# Patient Record
Sex: MALE | Race: WHITE | NOT HISPANIC OR LATINO | URBAN - METROPOLITAN AREA
[De-identification: names, ages, dates, MRNs, and addresses within clinical notes are randomized per-mention and may not be internally consistent; named-entity substitution may affect disease eponyms.]

---

## 2020-06-09 ENCOUNTER — OFFICE VISIT (OUTPATIENT)
Dept: URBAN - METROPOLITAN AREA LAB 3 | Facility: LAB | Age: 65
End: 2020-06-09

## 2021-05-18 PROBLEM — 27719009 ACUTE GI BLEEDING: Status: ACTIVE | Noted: 2021-05-18

## 2021-06-21 ENCOUNTER — TELEPHONE ENCOUNTER (OUTPATIENT)
Dept: URBAN - METROPOLITAN AREA CLINIC 98 | Facility: CLINIC | Age: 66
End: 2021-06-21

## 2021-06-28 ENCOUNTER — OFFICE VISIT (OUTPATIENT)
Dept: URBAN - METROPOLITAN AREA LAB 3 | Facility: LAB | Age: 66
End: 2021-06-28

## 2021-07-01 ENCOUNTER — TELEPHONE ENCOUNTER (OUTPATIENT)
Dept: URBAN - METROPOLITAN AREA CLINIC 19 | Facility: CLINIC | Age: 66
End: 2021-07-01

## 2021-07-07 PROBLEM — 429699009: Status: ACTIVE | Noted: 2021-06-23

## 2021-07-22 ENCOUNTER — OFFICE VISIT (OUTPATIENT)
Dept: URBAN - METROPOLITAN AREA MEDICAL CENTER 25 | Facility: MEDICAL CENTER | Age: 66
End: 2021-07-22
Payer: MEDICARE

## 2021-07-22 ENCOUNTER — DASHBOARD ENCOUNTERS (OUTPATIENT)
Age: 66
End: 2021-07-22

## 2021-07-22 DIAGNOSIS — D12.5 ADENOMA OF SIGMOID COLON: ICD-10-CM

## 2021-07-22 DIAGNOSIS — Z85.038 H/O COLON CANCER, STAGE I: ICD-10-CM

## 2021-07-22 DIAGNOSIS — D12.8 ADENOMATOUS POLYP OF RECTUM: ICD-10-CM

## 2021-07-22 DIAGNOSIS — D12.3 ADENOMA OF TRANSVERSE COLON: ICD-10-CM

## 2021-07-22 DIAGNOSIS — D12.4 ADENOMA OF DESCENDING COLON: ICD-10-CM

## 2021-07-22 PROCEDURE — 45380 COLONOSCOPY AND BIOPSY: CPT | Performed by: INTERNAL MEDICINE

## 2021-07-22 PROCEDURE — 45385 COLONOSCOPY W/LESION REMOVAL: CPT | Performed by: INTERNAL MEDICINE

## 2021-07-22 RX ORDER — SOY PROTEIN
POWDER (GRAM) ORAL
Qty: 0 | Refills: 0 | Status: ACTIVE | COMMUNITY
Start: 1900-01-01 | End: 1900-01-01

## 2021-07-28 ENCOUNTER — TELEPHONE ENCOUNTER (OUTPATIENT)
Dept: URBAN - METROPOLITAN AREA CLINIC 92 | Facility: CLINIC | Age: 66
End: 2021-07-28

## 2022-02-15 PROBLEM — I25.10 NONOCCLUSIVE CORONARY ATHEROSCLEROSIS OF NATIVE CORONARY ARTERY: Status: ACTIVE | Noted: 2019-12-09

## 2022-02-15 PROBLEM — T85.611A PD CATHETER DYSFUNCTION (HCC): Status: ACTIVE | Noted: 2022-02-02

## 2022-02-15 PROBLEM — I10 ESSENTIAL HYPERTENSION: Status: ACTIVE | Noted: 2019-06-12

## 2022-02-15 PROBLEM — D69.6 THROMBOCYTOPENIA (HCC): Status: ACTIVE | Noted: 2022-02-15

## 2022-02-15 PROBLEM — M25.561 ARTHRALGIA OF BOTH KNEES: Status: ACTIVE | Noted: 2019-05-13

## 2022-02-15 PROBLEM — Z95.0 PRESENCE OF CARDIAC PACEMAKER: Status: ACTIVE | Noted: 2019-12-09

## 2022-02-15 PROBLEM — I44.2 CHB (COMPLETE HEART BLOCK) (HCC): Status: ACTIVE | Noted: 2019-10-30

## 2022-02-15 PROBLEM — N40.0 BENIGN PROSTATIC HYPERPLASIA: Status: ACTIVE | Noted: 2019-08-15

## 2022-02-15 PROBLEM — M25.562 ARTHRALGIA OF BOTH KNEES: Status: ACTIVE | Noted: 2019-05-13

## 2022-02-15 PROBLEM — N18.5 CHRONIC KIDNEY DISEASE (CKD), STAGE V (HCC): Status: ACTIVE | Noted: 2022-02-02

## 2022-02-15 PROBLEM — I48.19 PERSISTENT ATRIAL FIBRILLATION (HCC): Status: ACTIVE | Noted: 2019-06-12

## 2022-02-16 PROBLEM — D69.6 THROMBOCYTOPENIA (HCC): Status: RESOLVED | Noted: 2022-02-15 | Resolved: 2022-02-16

## 2022-03-04 PROBLEM — Z79.01 CHRONIC ANTICOAGULATION: Status: ACTIVE | Noted: 2022-03-04

## 2022-03-04 PROBLEM — I48.19 PERSISTENT ATRIAL FIBRILLATION (HCC): Status: RESOLVED | Noted: 2019-06-12 | Resolved: 2022-03-04

## 2022-03-04 PROBLEM — Z98.890 S/P MITRAL VALVE REPAIR: Status: ACTIVE | Noted: 2022-03-04

## 2022-03-04 PROBLEM — I48.21 PERMANENT ATRIAL FIBRILLATION (HCC): Status: ACTIVE | Noted: 2022-03-04

## 2022-03-14 ENCOUNTER — HOSPITAL ENCOUNTER (OUTPATIENT)
Dept: LAB | Age: 67
Discharge: HOME | End: 2022-03-14
Payer: MEDICARE

## 2022-03-14 DIAGNOSIS — Z85.038 HISTORY OF COLON CANCER: ICD-10-CM

## 2022-03-14 DIAGNOSIS — R68.89 OTHER GENERAL SYMPTOMS AND SIGNS: ICD-10-CM

## 2022-03-14 DIAGNOSIS — D64.9 ANEMIA, UNSPECIFIED TYPE: ICD-10-CM

## 2022-03-14 LAB
ALBUMIN SERPL-MCNC: 3 G/DL (ref 3.2–4.6)
ALBUMIN/GLOB SERPL: 0.7 {RATIO} (ref 1.2–3.5)
ALP SERPL-CCNC: 82 U/L (ref 50–136)
ALT SERPL-CCNC: 10 U/L (ref 12–65)
ANION GAP SERPL CALC-SCNC: 9 MMOL/L (ref 7–16)
AST SERPL-CCNC: 18 U/L (ref 15–37)
BASOPHILS # BLD: 0.1 K/UL (ref 0–0.2)
BASOPHILS NFR BLD: 1 % (ref 0–2)
BILIRUB SERPL-MCNC: 0.4 MG/DL (ref 0.2–1.1)
BUN SERPL-MCNC: 69 MG/DL (ref 8–23)
CALCIUM SERPL-MCNC: 8.5 MG/DL (ref 8.3–10.4)
CEA SERPL-MCNC: 5.7 NG/ML (ref 0–3)
CHLORIDE SERPL-SCNC: 100 MMOL/L (ref 98–107)
CO2 SERPL-SCNC: 26 MMOL/L (ref 21–32)
CREAT SERPL-MCNC: 6 MG/DL (ref 0.8–1.5)
DIFFERENTIAL METHOD BLD: ABNORMAL
EOSINOPHIL # BLD: 0.3 K/UL (ref 0–0.8)
EOSINOPHIL NFR BLD: 3 % (ref 0.5–7.8)
ERYTHROCYTE [DISTWIDTH] IN BLOOD BY AUTOMATED COUNT: 14.2 % (ref 11.9–14.6)
FERRITIN SERPL-MCNC: 496 NG/ML (ref 8–388)
FOLATE SERPL-MCNC: 8.7 NG/ML (ref 3.1–17.5)
GLOBULIN SER CALC-MCNC: 4.2 G/DL (ref 2.3–3.5)
GLUCOSE SERPL-MCNC: 142 MG/DL (ref 65–100)
HCT VFR BLD AUTO: 36.7 %
HGB BLD-MCNC: 11.2 G/DL (ref 13.6–17.2)
HGB RETIC QN AUTO: 36 PG (ref 29–35)
IMM GRANULOCYTES # BLD AUTO: 0.1 K/UL (ref 0–0.5)
IMM GRANULOCYTES NFR BLD AUTO: 1 % (ref 0–5)
IMM RETICS NFR: 21.9 % (ref 2.3–13.4)
IRON SATN MFR SERPL: 21 %
IRON SERPL-MCNC: 57 UG/DL (ref 35–150)
LYMPHOCYTES # BLD: 0.9 K/UL (ref 0.5–4.6)
LYMPHOCYTES NFR BLD: 11 % (ref 13–44)
MCH RBC QN AUTO: 30.6 PG (ref 26.1–32.9)
MCHC RBC AUTO-ENTMCNC: 30.5 G/DL (ref 31.4–35)
MCV RBC AUTO: 100.3 FL (ref 79.6–97.8)
MONOCYTES # BLD: 0.5 K/UL (ref 0.1–1.3)
MONOCYTES NFR BLD: 6 % (ref 4–12)
NEUTS SEG # BLD: 6.2 K/UL (ref 1.7–8.2)
NEUTS SEG NFR BLD: 78 % (ref 43–78)
NRBC # BLD: 0 K/UL (ref 0–0.2)
PLATELET # BLD AUTO: 200 K/UL (ref 150–450)
PMV BLD AUTO: 12.5 FL (ref 9.4–12.3)
POTASSIUM SERPL-SCNC: 3.5 MMOL/L (ref 3.5–5.1)
PROT SERPL-MCNC: 7.2 G/DL (ref 6.3–8.2)
RBC # BLD AUTO: 3.66 M/UL (ref 4.23–5.6)
RETICS # AUTO: 0.09 M/UL (ref 0.03–0.1)
RETICS/RBC NFR AUTO: 2.5 % (ref 0.3–2)
SODIUM SERPL-SCNC: 135 MMOL/L (ref 136–145)
TIBC SERPL-MCNC: 274 UG/DL (ref 250–450)
VIT B12 SERPL-MCNC: 433 PG/ML (ref 193–986)
WBC # BLD AUTO: 8 K/UL (ref 4.3–11.1)

## 2022-03-14 PROCEDURE — 82746 ASSAY OF FOLIC ACID SERUM: CPT

## 2022-03-14 PROCEDURE — 83540 ASSAY OF IRON: CPT

## 2022-03-14 PROCEDURE — 36415 COLL VENOUS BLD VENIPUNCTURE: CPT

## 2022-03-14 PROCEDURE — 80053 COMPREHEN METABOLIC PANEL: CPT

## 2022-03-14 PROCEDURE — 82378 CARCINOEMBRYONIC ANTIGEN: CPT

## 2022-03-14 PROCEDURE — 82728 ASSAY OF FERRITIN: CPT

## 2022-03-14 PROCEDURE — 85046 RETICYTE/HGB CONCENTRATE: CPT

## 2022-03-14 PROCEDURE — 85025 COMPLETE CBC W/AUTO DIFF WBC: CPT

## 2022-03-14 PROCEDURE — 82607 VITAMIN B-12: CPT

## 2022-03-18 PROBLEM — N18.5 CHRONIC KIDNEY DISEASE (CKD), STAGE V (HCC): Status: ACTIVE | Noted: 2022-02-02

## 2022-03-19 PROBLEM — M25.562 ARTHRALGIA OF BOTH KNEES: Status: ACTIVE | Noted: 2019-05-13

## 2022-03-19 PROBLEM — I10 ESSENTIAL HYPERTENSION: Status: ACTIVE | Noted: 2019-06-12

## 2022-03-19 PROBLEM — I44.2 CHB (COMPLETE HEART BLOCK) (HCC): Status: ACTIVE | Noted: 2019-10-30

## 2022-03-19 PROBLEM — N40.0 BENIGN PROSTATIC HYPERPLASIA: Status: ACTIVE | Noted: 2019-08-15

## 2022-03-19 PROBLEM — I25.10 NONOCCLUSIVE CORONARY ATHEROSCLEROSIS OF NATIVE CORONARY ARTERY: Status: ACTIVE | Noted: 2019-12-09

## 2022-03-19 PROBLEM — Z98.890 S/P MITRAL VALVE REPAIR: Status: ACTIVE | Noted: 2022-03-04

## 2022-03-19 PROBLEM — Z95.0 S/P PLACEMENT OF CARDIAC PACEMAKER: Status: ACTIVE | Noted: 2019-12-09

## 2022-03-19 PROBLEM — M25.561 ARTHRALGIA OF BOTH KNEES: Status: ACTIVE | Noted: 2019-05-13

## 2022-03-19 PROBLEM — T85.611A PD CATHETER DYSFUNCTION (HCC): Status: ACTIVE | Noted: 2022-02-02

## 2022-03-19 PROBLEM — Z79.01 CHRONIC ANTICOAGULATION: Status: ACTIVE | Noted: 2022-03-04

## 2022-03-19 PROBLEM — I48.21 PERMANENT ATRIAL FIBRILLATION (HCC): Status: ACTIVE | Noted: 2022-03-04

## 2022-05-23 DIAGNOSIS — Z85.038 HISTORY OF COLON CANCER: Primary | ICD-10-CM

## 2022-06-01 ENCOUNTER — OFFICE VISIT (OUTPATIENT)
Dept: CARDIOLOGY CLINIC | Age: 67
End: 2022-06-01
Payer: MEDICARE

## 2022-06-01 VITALS
WEIGHT: 266 LBS | BODY MASS INDEX: 39.4 KG/M2 | SYSTOLIC BLOOD PRESSURE: 138 MMHG | HEIGHT: 69 IN | DIASTOLIC BLOOD PRESSURE: 70 MMHG | HEART RATE: 90 BPM

## 2022-06-01 DIAGNOSIS — I48.21 PERMANENT ATRIAL FIBRILLATION (HCC): ICD-10-CM

## 2022-06-01 DIAGNOSIS — I25.10 NONOCCLUSIVE CORONARY ATHEROSCLEROSIS OF NATIVE CORONARY ARTERY: Primary | ICD-10-CM

## 2022-06-01 DIAGNOSIS — I10 ESSENTIAL HYPERTENSION: ICD-10-CM

## 2022-06-01 DIAGNOSIS — Z79.01 CHRONIC ANTICOAGULATION: ICD-10-CM

## 2022-06-01 DIAGNOSIS — Z95.0 S/P PLACEMENT OF CARDIAC PACEMAKER: ICD-10-CM

## 2022-06-01 DIAGNOSIS — Z98.890 S/P MITRAL VALVE REPAIR: ICD-10-CM

## 2022-06-01 DIAGNOSIS — N18.6 ESRD (END STAGE RENAL DISEASE) ON DIALYSIS (HCC): ICD-10-CM

## 2022-06-01 DIAGNOSIS — Z99.2 ESRD (END STAGE RENAL DISEASE) ON DIALYSIS (HCC): ICD-10-CM

## 2022-06-01 PROCEDURE — G8427 DOCREV CUR MEDS BY ELIG CLIN: HCPCS | Performed by: INTERNAL MEDICINE

## 2022-06-01 PROCEDURE — G8417 CALC BMI ABV UP PARAM F/U: HCPCS | Performed by: INTERNAL MEDICINE

## 2022-06-01 PROCEDURE — 3017F COLORECTAL CA SCREEN DOC REV: CPT | Performed by: INTERNAL MEDICINE

## 2022-06-01 PROCEDURE — 1123F ACP DISCUSS/DSCN MKR DOCD: CPT | Performed by: INTERNAL MEDICINE

## 2022-06-01 PROCEDURE — 99214 OFFICE O/P EST MOD 30 MIN: CPT | Performed by: INTERNAL MEDICINE

## 2022-06-01 PROCEDURE — 1036F TOBACCO NON-USER: CPT | Performed by: INTERNAL MEDICINE

## 2022-06-01 RX ORDER — CIPROFLOXACIN 250 MG/1
250 TABLET, FILM COATED ORAL 2 TIMES DAILY
COMMUNITY
End: 2022-10-12

## 2022-06-01 RX ORDER — METHYLPREDNISOLONE 4 MG/1
2 TABLET ORAL DAILY
COMMUNITY
End: 2022-08-18

## 2022-06-01 RX ORDER — CALCITRIOL 0.5 UG/1
0.5 CAPSULE, LIQUID FILLED ORAL DAILY
COMMUNITY

## 2022-06-01 RX ORDER — NALOXONE HYDROCHLORIDE 4 MG/.1ML
1 SPRAY NASAL PRN
COMMUNITY

## 2022-06-01 RX ORDER — CARVEDILOL 12.5 MG/1
TABLET ORAL 2 TIMES DAILY
COMMUNITY
Start: 2022-02-05

## 2022-06-01 RX ORDER — FEBUXOSTAT 40 MG/1
40 TABLET, FILM COATED ORAL DAILY
COMMUNITY
End: 2022-10-12

## 2022-06-01 RX ORDER — NIFEDIPINE 60 MG/1
60 TABLET, FILM COATED, EXTENDED RELEASE ORAL DAILY
COMMUNITY
End: 2022-08-18

## 2022-06-01 ASSESSMENT — ENCOUNTER SYMPTOMS
ABDOMINAL PAIN: 0
DOUBLE VISION: 0
HEMOPTYSIS: 0
WHEEZING: 0
HOARSE VOICE: 0
HEMATEMESIS: 0
STRIDOR: 0
EYE REDNESS: 0
HEMATOCHEZIA: 0

## 2022-06-01 NOTE — PROGRESS NOTES
[facility]  [address] [facility], [address]  PHONE: [phone]          22    NAME:  Karen Mckoy  : 1955  MRN: [id number]         SUBJECTIVE:   Karen Mckoy is a 69 y.o. male seen for a visit regarding the following:     Chief Complaint   Patient presents with    Atrial Fibrillation    Results     echo           HPI:      1. Permanent Afib- started in - had stroke- Chads score= 6- on Eliquis now, was on Xarelto  -Echo from 3/18/2022 shows an EF at 60 to 65% with no regional wall motion abnormalities, mild concentric LVH, severely dilated left atrium, s/p mitral valve repair. 2.  2019- Severe Mitral regurgitation s/p mitral valve repair with 33mm annuloplasty ring- [facility] in [place]    3. Complete heart block s/p permanent pacemaker placement - Medtronic device-placed following valve repair surgery   4. Coronary artery disease from heart cath in  but nonobstructive- Catheterization from 2019-minimal luminal irregularities in the left main, LAD, circumflex and right coronary arteries. 5. ESRD on HD   6. HTN   7. HLD   8. Chronic anticoagulation-currently on Eliquis 5 mg twice daily-had GI bleed on Xarelto previously      Previous cardiologist- Dr. Renato Bills     Dear Dr Branden Nava,   I saw Mr Darcy Romero who is a pleasant 70-year-old gentleman in cardiovascular follow-up on 2022. We last saw him in consultation on 3/4/2022 for atrial fibrillation-permanent s/p permanent pacemaker placement for complete heart block-Medtronic device,   Nonobstructive coronary artery disease, hypertension, hyperlipidemia, s/p mitral valve repair for severe mitral regurgitation.  -At that time we ordered an echocardiogram to review LV function and we try to track down records from [place]. He is to be seen by Dr. Villa Thompson in [place] and has moved here to live here.     He has end-stage renal disease and has already established with a nephrologist and dialysis center.      -He had issues with peritoneal dialysis so he is now sticking with hemodialysis. Has a fistula placed in the left arm about 6 weeks ago. He has known hypertension hyperlipidemia with minor nonobstructive coronary artery disease and denies headaches or blurry vision. Feels that his blood pressures are usually fairly well controlled. Initial blood pressure readings here were high but a  recheck was better. He is on a combination of hydralazine, carvedilol and Hytrin.     -Denies significant lower extremity edema orthopnea PND-s/p mitral valve repair surgery for severe mitral regurgitation in the past-2019 with additional 33 mm annuloplasty ring.     -He has a Medtronic device for permanent pacemaker that was placed after his mitral valve surgery as he developed complete heart block. He has underlying atrial fibrillation      Denies any anginal chest discomfort in any way. Has some chronic dyspnea on exertion-NYHA class II but nothing excessive. No recent echocardiogram available to review. Denies any bleeding issues currently on Eliquis 5 mg twice daily. He was on Xarelto in the past and had a GI bleed.   -With a GFR of less than 15, and previous GI bleeding,  I want to avoid Xarelto       Past Medical History, Past Surgical History, Family history, Social History, and Medications were all reviewed with the patient today and updated as necessary.      No Known Allergies  Patient Active Problem List   Diagnosis    Chronic kidney disease (CKD), stage V ([address]    S/P mitral valve repair    Essential hypertension    Benign prostatic hyperplasia    PD catheter dysfunction ([address]    CHB (complete heart block) (HCC)    Arthralgia of both knees    S/P placement of cardiac pacemaker    Chronic anticoagulation    Nonocclusive coronary atherosclerosis of native coronary artery    Permanent atrial fibrillation ([address]    ESRD (end stage renal disease) on dialysis [facility])     Past Medical History:   Diagnosis Date    [facility]     Cancer [facility]     Chronic kidney disease     [facility]         Depression     Hypercholesterolemia     Hypertension      Past Surgical History:   Procedure Laterality Date    GASTRIC BYPASS SURGERY      HX PARTIAL COLECTOMY          OTHER SURGICAL HISTORY      mitral valve repair     Family History   Problem Relation Age of Onset    Alzheimer's Disease Sister     Colon Cancer Mother     Heart Disease Father     Kidney Disease Sister      Social History     Tobacco Use    Smoking status: Former Smoker     Quit date: 1984     Years since quittin.4    Smokeless tobacco: Never Used   Substance Use Topics    Alcohol use: Yes     Current Outpatient Medications   Medication Sig Dispense Refill    carvedilol (COREG) 12.5 MG tablet       naloxone 4 MG/0.1ML LIQD nasal spray 1 spray by Nasal route as needed for Opioid Reversal      ciprofloxacin (CIPRO) 250 mg tablet Take 250 mg by mouth 2 times daily      calcitRIOL (ROCALTROL) 0.5 MCG capsule Take 0.5 mcg by mouth daily      NIFEdipine (ADALAT CC) 60 MG extended release tablet Take 60 mg by mouth daily      febuxostat (ULORIC) 40 MG TABS tablet Take 40 mg by mouth daily      methylPREDNISolone (MEDROL) 4 MG tablet Take 2 mg by mouth daily      apixaban (ELIQUIS) 5 MG TABS tablet Take 5 mg by mouth 2 times daily      Cyanocobalamin 1000 MCG CAPS Cyanocobalamin (vitamin B-12)      escitalopram (LEXAPRO) 10 MG tablet Take 10 mg by mouth daily      hydrALAZINE (APRESOLINE) 50 MG tablet Take 50 mg by mouth 3 times daily      simvastatin (ZOCOR) 20 MG tablet Take by mouth      terazosin (HYTRIN) 5 MG capsule Take 5 mg by mouth      torsemide (DEMADEX) 20 MG tablet Take 40 mg by mouth 2 times daily       No current facility-administered medications for this visit.        Review of Systems   Constitutional: Negative for chills and fever. HENT: Negative for ear discharge, hoarse voice and stridor. Eyes: Negative for double vision and redness. Cardiovascular: Negative for cyanosis and syncope. Respiratory: Negative for hemoptysis and wheezing. Endocrine: Negative for polydipsia and polyphagia. Hematologic/Lymphatic: Negative for adenopathy. Skin: Negative for itching and rash. Musculoskeletal: Negative for joint swelling and muscle weakness. Gastrointestinal: Negative for abdominal pain, hematemesis and hematochezia. Genitourinary: Negative for flank pain and nocturia. Neurological: Negative for focal weakness and seizures. Psychiatric/Behavioral: Negative for altered mental status and suicidal ideas. Allergic/Immunologic: Negative for hives. PHYSICAL EXAM:    /70   Pulse 90   Ht 5' 9\" (1.753 m)   Wt 266 lb (120.7 kg)   BMI 39.28 kg/m²      Physical Exam    General: Alert and oriented in no acute distress  HEENT: Head is normocephalic, atraumatic, pupils are equal bilaterally, throat appears to be clear  Neck: No significant jugular venous distention no cervical bruits  Cardiovascular: S1 and S2 heard, regular  Rhythm-likely paced, rate controlled no significant murmurs rubs or gallops. Respiratory: Clear to auscultation bilaterally with no adventitious sounds, respirations are normal  Abdomen: Soft, nontender, nondistended, bowel sounds present. Extremities: No cyanosis clubbing or edema, fistula noted in the left forearm. Peripheral pulses: Bilateral radial artery pulses are palpated. Bilateral pedal pulses are diminished  Neuro: No facial droop and no gross focal motor deficits  Lymphatic: No significant cervical lymphadenopathy noted. Musculoskeletal: No significant redness or swelling noted in all exposed joints. Skin: No significant rashes noted the of the exposed regions. Medical problems and test results were reviewed with the patient today.      Recent Results (from the past 672 hour(s))   Transthoracic echocardiogram (TTE) complete with contrast, bubble, strain, and 3D PRN    Collection Time: 05/05/22  4:10 PM   Result Value Ref Range    LV ESV A4C 47 mL    LV EDV A4C 121 mL    LV ESV A2C 36 mL    LV EDV A2C 86 mL    LVOT VTI 20.4 cm    LVOT Diameter 2.2 cm    LVOT Mean Gradient 3 mmHg    LVOT Peak Velocity 1.1 m/s    LVPWd 1.2 (A) 0.6 - 1 cm    LVOT Peak Gradient 5 mmHg    IVSd 1.2 (A) 0.6 - 1 cm    LVIDs 2.7 cm    LVIDd 4.7 4.2 - 5.9 cm    EF BP 61 55 - 100 %    LVOT SV 77.5 ml    LV Ejection Fraction A2C 58 %    LV Ejection Fraction A4C 62 %    LA Diameter 5.8 cm    AV Peak Gradient 8 mmHg    AV Peak Velocity 1.4 m/s    AV Area by Peak Velocity 3.0 Square Centimeter    MV E Velocity 1.32 m/s    MV Mean Gradient 2 mmHg    MV Peak Gradient 7 mmHg    MV .0 ms    MV VTI 38.1 cm    MV Mean Velocity 0.7 m/s    MV Area by PHT 1.7 Square Centimeter    MV Area by VTI 2.0 Square Centimeter    TR Max Velocity 2.34 m/s    TR Peak Gradient 22 mmHg    Aortic Root 3.7 cm    Fractional Shortening 2D 43 28 - 44 %    LV ESV Index A4C 21 mL/m2    LV EDV Index A4C 53 mL/m2    LV ESV Index A2C 16 mL/m2    LV EDV Index A2C 38 mL/m2    LVIDd Index 2.05 cm/m2    LVIDs Index 1.18 cm/m2    LV RWT Ratio 0.51     LV Mass 2D 212.0 88 - 224 g    LV Mass 2D Index 92.6 49 - 115 g/m2    LVOT Stroke Volume Index 33.8 mL/m2    LVOT Area 3.8 cm2    LA Size Index 2.53 cm/m2    LA/AO Root Ratio 1.57     Ao Root Index 1.62 cm/m2    AV Velocity Ratio 0.79     MV:LVOT VTI Index 1.87     RV Basal Dimension 3.7 cm    RV Mid Dimension 2.1 cm    TAPSE 2.1 1.7 cm    LA Volume  (A) 18 - 58 mL    LA Volume Index BP 47 (A) 16 - 34 ml/m2    Est. RA Pressure 5 mmHg    RVSP 27 mmHg     No results found for: CHOL, CHOLPOCT, CHOLX, CHLST, CHOLV, HDL, HDLPOC, HDLC, LDL, LDLC, VLDLC, VLDL, TGLX, TRIGL,hemoglobin, basic metabolic panel, No results found for: TSH, TSH2, TSH3 ,  Lab Results   Component Value Date     03/14/2022    K 3.5 03/14/2022     03/14/2022    CO2 26 03/14/2022    BUN 69 03/14/2022    GFRAA 12 03/14/2022    GLOB 4.2 03/14/2022    ALT 10 03/14/2022    AST 18 03/14/2022      No results found for: LDLCALC, LDLCHOLESTEROL, LDLDIRECT     03/18/22    TRANSTHORACIC ECHOCARDIOGRAM (TTE) COMPLETE (CONTRAST/BUBBLE/3D PRN) 05/06/2022  7:23 AM (Final)        Left Ventricle: Normal left ventricular systolic function with a visually estimated EF of 60 - 65%. Left ventricle size is normal. Mildly increased wall thickness. Findings consistent with mild concentric hypertrophy. Normal wall motion.   Mitral Valve: Valve repaired. Mild annular calcification of the mitral valve.   Left Atrium: Left atrium is severely dilated. LA Vol Index is  47 ml/m2.   Aorta: Mildly dilated aortic root. Signed by: Lv Logan MD on 5/6/2022  7:23 AM       ASSESSMENT and PLAN    Nonocclusive coronary atherosclerosis of native coronary artery  -Nonobstructive CAD-managed medically-on beta-blocker therapy with calcium channel blocker therapy-dual antianginal therapy with no complaints of any angina. Also remains on appropriate statin therapy. Not on aspirin as he is on Eliquis. Permanent atrial fibrillation (HCC)  -S/p permanent pacemaker placement, on carvedilol 12.5 mg twice daily and anticoagulated with Eliquis for thromboembolic prophylaxis-5 mg twice daily as he is on hemodialysis. Has underlying complete heart block which is why his rhythm sounds regular. S/P placement of cardiac pacemaker  -Now s/p Medtronic permanent pacemaker placement-last check shows adequate battery life, short runs of nonsustained VT but preserved LV function and no concerning findings. Essential hypertension  -Well-controlled on current therapy. S/P mitral valve repair  -S/p mitral valve repair with plication of the posterior leaflet and has a #33 annuloplasty ring.   No mitral regurgitation noted on most recent echo from 2022    Chronic anticoagulation  -Remains on Eliquis for thromboembolic prophylaxis  ESRD (end stage renal disease) on dialysis [facility])  -On hemodialysis on Tuesdays Thursdays and Saturdays       Overall Impression  -Stable from a cardiac perspective overall. Multiple issues as mentioned above. Continues to have device checks remotely every 3 months and in person every 6 months. I will see him in follow-up in about 6 months time but of course we will see him sooner if needed. Return in about 6 months (around 12/1/2022) for htn, mitral regurg s/p MVR, afib. Thank you for allowing us to participate in the care of your patient. If you have any further questions, please do not hesitate to contact us.   Sincerely,        Joyce Holland MD   6/1/2022

## 2022-06-09 ENCOUNTER — TELEPHONE (OUTPATIENT)
Dept: CARDIOLOGY CLINIC | Age: 67
End: 2022-06-09

## 2022-06-09 ENCOUNTER — PREP FOR PROCEDURE (OUTPATIENT)
Dept: ADMINISTRATIVE | Age: 67
End: 2022-06-09

## 2022-06-10 RX ORDER — SODIUM CHLORIDE 0.9 % (FLUSH) 0.9 %
5-40 SYRINGE (ML) INJECTION EVERY 12 HOURS SCHEDULED
Status: CANCELLED | OUTPATIENT
Start: 2022-06-10

## 2022-06-10 RX ORDER — SODIUM CHLORIDE 0.9 % (FLUSH) 0.9 %
5-40 SYRINGE (ML) INJECTION PRN
Status: CANCELLED | OUTPATIENT
Start: 2022-06-10

## 2022-06-10 RX ORDER — SODIUM CHLORIDE 9 MG/ML
INJECTION, SOLUTION INTRAVENOUS PRN
Status: CANCELLED | OUTPATIENT
Start: 2022-06-10

## 2022-06-23 ENCOUNTER — TELEPHONE (OUTPATIENT)
Dept: CARDIOLOGY CLINIC | Age: 67
End: 2022-06-23

## 2022-06-23 NOTE — TELEPHONE ENCOUNTER
Spoke with Krystal Yi from [address] [place] [address] at [phone]. Verified what information needed to be on pts application and resubmitted. Faxed and put to have scanned into pts chart.

## 2022-06-24 ENCOUNTER — TELEPHONE (OUTPATIENT)
Dept: CARDIOLOGY CLINIC | Age: 67
End: 2022-06-24

## 2022-06-24 NOTE — TELEPHONE ENCOUNTER
Spoke to a rep from [employer] at [phone] and she stated that unfortunately this pt does not qualify for pt assistance due to a over income limit for household size. She stated pt could submit a hardship letter and state the importance of the medication. Spoke to pt and made him aware of above response. He verbalized understanding. I made him aware if he wanted to appeal and wrote the hardship letter we could provide him with a office note if needed. Also provided pt with number for [facility]. Pt states he will stay with Eliquis as he is not going to switch to Warfarin. He requested we send him in a prescription and he will pay accordingly.

## 2022-08-16 ENCOUNTER — PREP FOR PROCEDURE (OUTPATIENT)
Dept: ADMINISTRATIVE | Age: 67
End: 2022-08-16

## 2022-08-17 RX ORDER — SODIUM CHLORIDE 0.9 % (FLUSH) 0.9 %
5-40 SYRINGE (ML) INJECTION PRN
Status: CANCELLED | OUTPATIENT
Start: 2022-08-17

## 2022-08-17 RX ORDER — SODIUM CHLORIDE 0.9 % (FLUSH) 0.9 %
5-40 SYRINGE (ML) INJECTION EVERY 12 HOURS SCHEDULED
Status: CANCELLED | OUTPATIENT
Start: 2022-08-17

## 2022-08-17 RX ORDER — SODIUM CHLORIDE 9 MG/ML
INJECTION, SOLUTION INTRAVENOUS PRN
Status: CANCELLED | OUTPATIENT
Start: 2022-08-17

## 2022-08-22 ENCOUNTER — ANESTHESIA EVENT (OUTPATIENT)
Dept: ENDOSCOPY | Age: 67
End: 2022-08-22
Payer: MEDICARE

## 2022-08-22 RX ORDER — SODIUM CHLORIDE 9 MG/ML
INJECTION, SOLUTION INTRAVENOUS PRN
Status: CANCELLED | OUTPATIENT
Start: 2022-08-22

## 2022-08-22 RX ORDER — SODIUM CHLORIDE 0.9 % (FLUSH) 0.9 %
5-40 SYRINGE (ML) INJECTION PRN
Status: CANCELLED | OUTPATIENT
Start: 2022-08-22

## 2022-08-22 RX ORDER — SODIUM CHLORIDE 0.9 % (FLUSH) 0.9 %
5-40 SYRINGE (ML) INJECTION EVERY 12 HOURS SCHEDULED
Status: CANCELLED | OUTPATIENT
Start: 2022-08-22

## 2022-08-22 RX ORDER — ONDANSETRON 2 MG/ML
4 INJECTION INTRAMUSCULAR; INTRAVENOUS
Status: CANCELLED | OUTPATIENT
Start: 2022-08-22 | End: 2022-08-22

## 2022-08-23 ENCOUNTER — ANESTHESIA (OUTPATIENT)
Dept: ENDOSCOPY | Age: 67
End: 2022-08-23
Payer: MEDICARE

## 2022-08-23 ENCOUNTER — HOSPITAL ENCOUNTER (OUTPATIENT)
Age: 67
Setting detail: OUTPATIENT SURGERY
Discharge: HOME | End: 2022-08-23
Attending: INTERNAL MEDICINE | Admitting: INTERNAL MEDICINE
Payer: MEDICARE

## 2022-08-23 VITALS
HEIGHT: 70 IN | TEMPERATURE: 97 F | HEART RATE: 59 BPM | WEIGHT: 270 LBS | BODY MASS INDEX: 38.65 KG/M2 | DIASTOLIC BLOOD PRESSURE: 69 MMHG | OXYGEN SATURATION: 97 % | RESPIRATION RATE: 14 BRPM | SYSTOLIC BLOOD PRESSURE: 117 MMHG

## 2022-08-23 LAB — POTASSIUM BLD-SCNC: 3.2 MMOL/L (ref 3.5–5.1)

## 2022-08-23 PROCEDURE — 2580000003 HC RX 258: Performed by: INTERNAL MEDICINE

## 2022-08-23 PROCEDURE — 84132 ASSAY OF SERUM POTASSIUM: CPT

## 2022-08-23 PROCEDURE — 88305 TISSUE EXAM BY PATHOLOGIST: CPT

## 2022-08-23 PROCEDURE — 2580000003 HC RX 258: Performed by: ANESTHESIOLOGY

## 2022-08-23 PROCEDURE — A4216 STERILE WATER/SALINE, 10 ML: HCPCS | Performed by: ANESTHESIOLOGY

## 2022-08-23 PROCEDURE — 3700000000 HC ANESTHESIA ATTENDED CARE: Performed by: INTERNAL MEDICINE

## 2022-08-23 PROCEDURE — 2709999900 HC NON-CHARGEABLE SUPPLY: Performed by: INTERNAL MEDICINE

## 2022-08-23 PROCEDURE — 6360000002 HC RX W HCPCS: Performed by: NURSE ANESTHETIST, CERTIFIED REGISTERED

## 2022-08-23 PROCEDURE — 3609012400 HC EGD TRANSORAL BIOPSY SINGLE/MULTIPLE: Performed by: INTERNAL MEDICINE

## 2022-08-23 PROCEDURE — 3700000001 HC ADD 15 MINUTES (ANESTHESIA): Performed by: INTERNAL MEDICINE

## 2022-08-23 PROCEDURE — 2500000003 HC RX 250 WO HCPCS: Performed by: ANESTHESIOLOGY

## 2022-08-23 PROCEDURE — 3609010600 HC COLONOSCOPY POLYPECTOMY SNARE/COLD BIOPSY: Performed by: INTERNAL MEDICINE

## 2022-08-23 PROCEDURE — 7100000010 HC PHASE II RECOVERY - FIRST 15 MIN: Performed by: INTERNAL MEDICINE

## 2022-08-23 PROCEDURE — 2500000003 HC RX 250 WO HCPCS: Performed by: NURSE ANESTHETIST, CERTIFIED REGISTERED

## 2022-08-23 PROCEDURE — 7100000011 HC PHASE II RECOVERY - ADDTL 15 MIN: Performed by: INTERNAL MEDICINE

## 2022-08-23 RX ORDER — SODIUM CHLORIDE 0.9 % (FLUSH) 0.9 %
5-40 SYRINGE (ML) INJECTION PRN
Status: DISCONTINUED | OUTPATIENT
Start: 2022-08-23 | End: 2022-08-23 | Stop reason: HOSPADM

## 2022-08-23 RX ORDER — SODIUM CHLORIDE 0.9 % (FLUSH) 0.9 %
5-40 SYRINGE (ML) INJECTION EVERY 12 HOURS SCHEDULED
Status: DISCONTINUED | OUTPATIENT
Start: 2022-08-23 | End: 2022-08-23 | Stop reason: HOSPADM

## 2022-08-23 RX ORDER — LIDOCAINE HYDROCHLORIDE 10 MG/ML
1 INJECTION, SOLUTION INFILTRATION; PERINEURAL
Status: DISCONTINUED | OUTPATIENT
Start: 2022-08-23 | End: 2022-08-23 | Stop reason: HOSPADM

## 2022-08-23 RX ORDER — PROPOFOL 10 MG/ML
INJECTION, EMULSION INTRAVENOUS PRN
Status: DISCONTINUED | OUTPATIENT
Start: 2022-08-23 | End: 2022-08-23 | Stop reason: SDUPTHER

## 2022-08-23 RX ORDER — SODIUM CHLORIDE, SODIUM LACTATE, POTASSIUM CHLORIDE, CALCIUM CHLORIDE 600; 310; 30; 20 MG/100ML; MG/100ML; MG/100ML; MG/100ML
INJECTION, SOLUTION INTRAVENOUS CONTINUOUS
Status: DISCONTINUED | OUTPATIENT
Start: 2022-08-23 | End: 2022-08-23 | Stop reason: HOSPADM

## 2022-08-23 RX ORDER — SODIUM CHLORIDE 9 MG/ML
INJECTION, SOLUTION INTRAVENOUS PRN
Status: DISCONTINUED | OUTPATIENT
Start: 2022-08-23 | End: 2022-08-23 | Stop reason: HOSPADM

## 2022-08-23 RX ORDER — LIDOCAINE HYDROCHLORIDE 20 MG/ML
INJECTION, SOLUTION EPIDURAL; INFILTRATION; INTRACAUDAL; PERINEURAL PRN
Status: DISCONTINUED | OUTPATIENT
Start: 2022-08-23 | End: 2022-08-23 | Stop reason: SDUPTHER

## 2022-08-23 RX ADMIN — PROPOFOL 100 MG: 10 INJECTION, EMULSION INTRAVENOUS at 07:57

## 2022-08-23 RX ADMIN — PROPOFOL 250 MCG/KG/MIN: 10 INJECTION, EMULSION INTRAVENOUS at 08:00

## 2022-08-23 RX ADMIN — LIDOCAINE HYDROCHLORIDE 100 MG: 20 INJECTION, SOLUTION EPIDURAL; INFILTRATION; INTRACAUDAL; PERINEURAL at 08:00

## 2022-08-23 RX ADMIN — FAMOTIDINE 20 MG: 10 INJECTION INTRAVENOUS at 07:05

## 2022-08-23 RX ADMIN — SODIUM CHLORIDE, SODIUM LACTATE, POTASSIUM CHLORIDE, AND CALCIUM CHLORIDE: 600; 310; 30; 20 INJECTION, SOLUTION INTRAVENOUS at 07:51

## 2022-08-23 RX ADMIN — SODIUM CHLORIDE: 9 INJECTION, SOLUTION INTRAVENOUS at 07:00

## 2022-08-23 ASSESSMENT — PAIN - FUNCTIONAL ASSESSMENT
PAIN_FUNCTIONAL_ASSESSMENT: NONE - DENIES PAIN

## 2022-08-23 NOTE — H&P
PreProcedure H&P Update    Today's Date:  8/23/2022    CC:  Anemia, personal h/o colon cancer, h/o colon polyps, family h/o colon cancer    Subjective:   HPI:As above    PMH:  Past Medical History:   Diagnosis Date    [facility]-fib ([facility]     Cancer ([facility]     Chronic kidney disease     Colon cancer ([facility]     2020    Complete heart block ([facility]     Depression     Hemodialysis patient ([facility]     Hypercholesterolemia     Hypertension        Medications:   Current Facility-Administered Medications   Medication Dose Route Frequency    lidocaine 1 % injection 1 mL  1 mL IntraDERmal Once PRN    lactated ringers infusion   IntraVENous Continuous    sodium chloride flush 0.9 % injection 5-40 mL  5-40 mL IntraVENous 2 times per day    sodium chloride flush 0.9 % injection 5-40 mL  5-40 mL IntraVENous PRN    sodium chloride flush 0.9 % injection 5-40 mL  5-40 mL IntraVENous 2 times per day    sodium chloride flush 0.9 % injection 5-40 mL  5-40 mL IntraVENous PRN    0.9 % sodium chloride infusion   IntraVENous PRN         Objective:   Vitals:  /83   Pulse 90   Temp 98.3 °F (36.8 °C) (Oral)   Resp 14   Ht 5' 10\" (1.778 m)   Wt 270 lb (122.5 kg)   SpO2 95%   BMI 38.74 kg/m²   Exam:  General appearance: alert, cooperative, no distress  Lungs: clear to auscultation bilaterally anteriorly  Heart: regular rate and rhythm  Abdomen: soft, non-tender. Bowel sounds normal. No masses, no organomegaly      Data Review (Labs):    No results for input(s): WBC, HGB, HCT, PLT, MCV, NA, K, CL, CO2, BUN, CREA, CA, GLU, ALB, TP, AML, INR, APTT in the last 72 hours. Invalid input(s): AP, SGOT, GPT, TBIL, DBIL, CBIL, LPSE, PTP    Plan:     Eliquis held. EGD/colonoscopy today.     Callie Collazo MD

## 2022-08-23 NOTE — OP NOTE
COLONOSCOPY    DATE of PROCEDURE: 8/23/2022    INDICATION:  Personal h/o colon cancer  Personal h/o colon polyps  3. Anemia    POSTPROCEDURE DIAGNOSIS:  Right hemicolectomy  Colon polyps  Diverticulosis of colon  Internal hemorrhoids    MEDICATION:   MAC. Further details per anesthesia note. INSTRUMENT: [id number]    PROCEDURE: After obtaining informed consent, the patient was placed in the left lateral position and sedated. The endoscope was advanced to the anastomosis however unable to identify the neoterminal ileum. The ava-terminal ileum was not entered. . On withdrawal, the colon was carefully visualized in a 360 degree fashion. Retroflexion was performed in the rectum. The patient was taken to the recovery area in stable condition. The quality of the bowel prep was good. FINDINGS:  Rectum: Small internal hemorrhoids. Otherwise normal exam.  Sigmoid: Single sessile polyp <5 mm in size. Cold-snare polypectomy. Mild diverticulosis. Otherwise normal exam.  Descending Colon: Single sessile polyp <5 mm in size. Cold-snare polypectomy. Mild diverticulosis. Otherwise normal exam.  Transverse Colon: Single sessile polyp <5 mm in size. Cold-snare polypectomy. Mild diverticulosis. Colonoscope advanced to what is felt to be anastomosis. However, despite extensive efforts, the neoterminal ileum could not be identified.   Otherwise normal exam.    Estimated blood loss: 0-minimal     Complication: none    Specimens obtained during procedure:  Colon polyps      PLAN:   Follow-up pathology  Repeat exam based on pathology  Can restart Eliquis in 1 week  Schedule Barium enema    Karime Rios MD

## 2022-08-23 NOTE — ANESTHESIA POSTPROCEDURE EVALUATION
Department of Anesthesiology  Postprocedure Note    Patient: Silvano Lao MRN: [id number]  YOB: 1955  Date of evaluation: 8/23/2022      Procedure Summary     Date: 08/23/22 Room / Location: [facility] ENDO 03 / [facility] ENDOSCOPY    Anesthesia Start: 0751 Anesthesia Stop: Shakir Kenroyhollandnathan    Procedures:       COLONOSCOPY POLYPECTOMY SNARE/COLD BIOPSY (Lower GI Region)      EGD BIOPSY (Upper GI Region) Diagnosis:       Colon cancer screening      (Colon cancer screening [Z12.11])    Surgeons: Rica Landaverde MD Responsible Provider: Marti Godinez MD    Anesthesia Type: TIVA ASA Status: 3          Anesthesia Type: No value filed.     Shabbir Phase I: Shabbir Score: 10    Shabbir Phase II: Shabbir Score: 10      Anesthesia Post Evaluation    Patient location during evaluation: PACU  Patient participation: complete - patient participated  Level of consciousness: awake and alert  Airway patency: patent  Nausea & Vomiting: no nausea  Cardiovascular status: hemodynamically stable  Respiratory status: acceptable  Hydration status: euvolemic

## 2022-08-23 NOTE — DISCHARGE INSTRUCTIONS
Gastrointestinal Esophagogastroduodenoscopy (EGD) and Colonoscopy- Discharge Instructions    1. Call Dr. Leandro Kelley   at [phone] for any problems or questions. 2. Contact the doctor's office for follow up appointment as directed. 3. Medication may cause drowsiness for several hours, therefore, do not drive or operate machinery for remainder of the day. 4. No alcohol today. 5. Do not make any important decisions such as signing legal paperwork. 6. Ordinarily, you may resume regular diet and activity after exam unless otherwise specified by your physician. 7. For mild soreness in your throat you may use Cepacol throat lozenges or warm  salt-water gargles as needed. 8. Because of air put into your colon during exam, you may experience some abdominal distension, relieved by the passage of gas, for several hours. 9. Contact your physician if you have any of the following:  Excessive amount of bleeding - large amount when having a bowel movement. Occasional specks of blood with bowel movement would not be unusual.  Severe abdominal pain  Fever or Chills    10. Polyp Removal - follow these additional instructions  Clear liquid diet for the next meal (jell-o, broth, clear drinks)  Soft diet for 24 hours, then resume regular diet   Take Metamucil - 1 tablespoon in juice every morning for 3 days, if needed. No Aspirin, Advil, Aleve, Nuprin, Ibuprofen, or medications that contain these drugs for 2 weeks. Any additional instructions:    Follow up with the office for pathology results   Omeprazole 40 mg twice a day for 8 weeks then go back to once a day  Carafate 1 gram before meals and at bedtime  ( make a slurry) take for 2 months  H pylori serum screen at the office  High fiber diet  Restart eliquis in 1 week  Will schedule barium enema  Next exam based on pathology

## 2022-08-23 NOTE — PROGRESS NOTES
Discharge instructions reviewed with wife  instructed to call cardiology to ask when or if to restart eliquis as this was stopped by cardiology 2 weeks ago. Other instructions reviewed  voiced understanding of discharge and follow up instructions. Will be discharged via wheelchair.

## 2022-08-23 NOTE — PROCEDURES
Esophagogastroduodenoscopy    DATE of PROCEDURE: 8/23/2022    INDICATION:  Anemia    POSTPROCEDURE DIAGNOSIS:  Irregular z-line  Hiatal hernia  Анна-en-y gastric bypass  Anastomotic ulcer    MEDICATIONS ADMINISTERED: MAC. Further details per anesthesia note. INSTRUMENT: [id number]    PROCEDURE:  After obtaining informed consent, the patient was placed in the left lateral position and sedated. The endoscope was advanced under direct vision without difficulty to the level of the jejunum portion of the duodenum. The esophagus, stomach (including retroflexed views) and duodenum were evaluated. The patient was taken to the recovery area in stable condition. FINDINGS:  ESOPHAGUS: Slightly irregular z-line. Cold-forceps biopsies for pathology. Otherwise normal exam.  STOMACH: 1 cm sliding hiatal hernia. Анна-en-y gastric bypass anatomy. Single 6-7 mm clean-based anastomotic ulcer. Anastomosis is patent.  Otherwise normal exam.  JEJUNUM: Normal    ESTIMATED BLOOD LOSS 0-minimal     COMPLICATIONS: none    Specimens obtained during procedure:   Distal esophageal biopsies    PLAN:   Omeprazole 40 mg PO BID for 8 weeks then daily dosing  Carafate 1g QAC and QHS (make slurry) for 2 months  Check H.pylori serology panel  Proceed with colonoscopy    Supriya Skinner MD

## 2022-08-23 NOTE — ANESTHESIA PRE PROCEDURE
Department of Anesthesiology  Preprocedure Note       Name:  Matti Lopez. Age:  69 y.o.  :  1955                                          MRN:  [id number]         Date:  2022      Surgeon: Sergo Esparza):  Owen Chong MD    Procedure: Procedure(s):  COLORECTAL CANCER SCREENING, NOT HIGH RISK  EGD ESOPHAGOGASTRODUODENOSCOPY/39 PT HAS PACEMAKER    Medications prior to admission:   Prior to Admission medications    Medication Sig Start Date End Date Taking? Authorizing Provider   apixaban (ELIQUIS) 5 MG TABS tablet Take 1 tablet by mouth 2 times daily 22   Michael Stock MD   carvedilol (COREG) 12.5 MG tablet 2 times daily 22   Historical Provider, MD   naloxone 4 MG/0.1ML LIQD nasal spray 1 spray by Nasal route as needed for Opioid Reversal    Historical Provider, MD   ciprofloxacin (CIPRO) 250 mg tablet Take 250 mg by mouth 2 times daily  Patient not taking: Reported on 2022    Historical Provider, MD   calcitRIOL (ROCALTROL) 0.5 MCG capsule Take 0.5 mcg by mouth daily    Historical Provider, MD   febuxostat (ULORIC) 40 MG TABS tablet Take 40 mg by mouth daily  Patient not taking: Reported on 2022    Historical Provider, MD   apixaban (ELIQUIS) 5 MG TABS tablet Take 5 mg by mouth 2 times daily  Patient not taking: Reported on 2022 2/15/22   Ar Automatic Reconciliation   Cyanocobalamin 1000 MCG CAPS Cyanocobalamin (vitamin B-12)    Ar Automatic Reconciliation   escitalopram (LEXAPRO) 10 MG tablet Take 10 mg by mouth daily    Ar Automatic Reconciliation   simvastatin (ZOCOR) 20 MG tablet Take by mouth nightly    Ar Automatic Reconciliation   terazosin (HYTRIN) 5 MG capsule Take 5 mg by mouth nightly    Ar Automatic Reconciliation       Current medications:    No current facility-administered medications for this encounter.        Allergies:  No Known Allergies    Problem List:    Patient Active Problem List   Diagnosis Code    Chronic kidney disease (CKD), stage V ([address] N18.5    S/P mitral valve repair Z98.890    Essential hypertension I10    Benign prostatic hyperplasia N40.0    PD catheter dysfunction ([address] T85.611A    CHB (complete heart block) ([facility]) I44.2    Arthralgia of both knees M25.561, M25.562    S/P placement of cardiac pacemaker Z95.0    Chronic anticoagulation Z79.01    Nonocclusive coronary atherosclerosis of native coronary artery I25.10    Permanent atrial fibrillation (HCC) I48.21    ESRD (end stage renal disease) on dialysis ([facility]) N18.6, Z99.2       Past Medical History:        Diagnosis Date    A-fib ([address]     [facility]     Chronic kidney disease     Colon cancer ([facility]     2020    Complete heart block ([address]     Depression     Hemodialysis patient [facility]     Hypercholesterolemia     Hypertension        Past Surgical History:        Procedure Laterality Date    AV FISTULA PLACEMENT      GASTRIC BYPASS SURGERY      HX PARTIAL COLECTOMY      2020    MITRAL VALVE REPAIR  2018    OTHER SURGICAL HISTORY      mitral valve repair    PACEMAKER PLACEMENT         Social History:    Social History     Tobacco Use    Smoking status: Former     Types: Cigarettes     Quit date: 1984     Years since quittin.6    Smokeless tobacco: Never   Substance Use Topics    Alcohol use:  Yes                                Counseling given: Not Answered      Vital Signs (Current):   Vitals:    22 1426 22 0643   BP:  114/83   Pulse:  90   Resp:  14   Temp:  98.3 °F (36.8 °C)   TempSrc:  Oral   SpO2:  95%   Weight: 270 lb (122.5 kg)    Height: 5' 10\" (1.778 m)                                               BP Readings from Last 3 Encounters:   22 114/83   22 138/70   22 92/68       NPO Status:                                                                                 BMI:   Wt Readings from Last 3 Encounters:   22 270 lb (122.5 kg)   22 266 lb (120.7 kg)   22 255 lb (115.7 kg)     Body mass index is 38.74 kg/m². CBC:   Lab Results   Component Value Date/Time    WBC 8.0 03/14/2022 10:03 AM    RBC 3.66 03/14/2022 10:03 AM    HGB 11.2 03/14/2022 10:03 AM    HCT 36.7 03/14/2022 10:03 AM    .3 03/14/2022 10:03 AM    RDW 14.2 03/14/2022 10:03 AM     03/14/2022 10:03 AM       CMP:   Lab Results   Component Value Date/Time     03/14/2022 10:03 AM    K 3.5 03/14/2022 10:03 AM     03/14/2022 10:03 AM    CO2 26 03/14/2022 10:03 AM    BUN 69 03/14/2022 10:03 AM    CREATININE 6.00 03/14/2022 10:03 AM    GFRAA 12 03/14/2022 10:03 AM    AGRATIO 0.7 03/14/2022 10:03 AM    GLUCOSE 142 03/14/2022 10:03 AM    PROT 7.2 03/14/2022 10:03 AM    CALCIUM 8.5 03/14/2022 10:03 AM    BILITOT 0.4 03/14/2022 10:03 AM    ALKPHOS 82 03/14/2022 10:03 AM    AST 18 03/14/2022 10:03 AM    ALT 10 03/14/2022 10:03 AM       POC Tests: No results for input(s): POCGLU, POCNA, POCK, POCCL, POCBUN, POCHEMO, POCHCT in the last 72 hours.     Coags: No results found for: PROTIME, INR, APTT    HCG (If Applicable): No results found for: PREGTESTUR, PREGSERUM, HCG, HCGQUANT     ABGs: No results found for: PHART, PO2ART, ETT5QDZ, TJH6HEP, BEART, F3MCYWRP     Type & Screen (If Applicable):  No results found for: LABABO, LABRH    Drug/Infectious Status (If Applicable):  No results found for: HIV, HEPCAB    COVID-19 Screening (If Applicable): No results found for: COVID19        Anesthesia Evaluation  Patient summary reviewed and Nursing notes reviewed  Airway: Mallampati: I  TM distance: >3 FB   Neck ROM: full  Comment: Heavy beard     Dental: normal exam         Pulmonary: breath sounds clear to auscultation                             Cardiovascular:  Exercise tolerance: good (>4 METS),   (+) hypertension:, valvular problems/murmurs (s/p MV repair):, pacemaker (VVIR):, CAD (nonobstructive):, dysrhythmias (hx CHB):, hyperlipidemia        Rhythm: regular  Rate: normal                 ROS comment: GIB on Eliquis (now stopped) and previously on xarelto    EF 65% on 3/18/22    Cath showed nonobstructive CAD 2019     Neuro/Psych:               GI/Hepatic/Renal:   (+) renal disease (t,r,sa; M): ESRD, morbid obesity         ROS comment: Hx gastric bypass    Hx recent GIB    Potassium .3. 2. Endo/Other:                      ROS comment: Gout  Recent transfusion of 2 units at [facility] for hgb 5.9 Abdominal:             Vascular: Other Findings:           Anesthesia Plan      TIVA     ASA 3       Induction: intravenous. Anesthetic plan and risks discussed with patient.                         Chilango Luevano MD   8/23/2022

## 2022-09-07 ENCOUNTER — HOSPITAL ENCOUNTER (OUTPATIENT)
Dept: CT IMAGING | Age: 67
Discharge: HOME | End: 2022-09-10
Payer: MEDICARE

## 2022-09-07 DIAGNOSIS — Z85.038 PERSONAL HISTORY OF COLON CANCER, STAGE II: ICD-10-CM

## 2022-09-07 DIAGNOSIS — Z85.038 HISTORY OF COLON CANCER: ICD-10-CM

## 2022-09-07 PROCEDURE — 74177 CT ABD & PELVIS W/CONTRAST: CPT

## 2022-09-07 PROCEDURE — 6360000004 HC RX CONTRAST MEDICATION: Performed by: INTERNAL MEDICINE

## 2022-09-07 RX ADMIN — IOPAMIDOL 100 ML: 755 INJECTION, SOLUTION INTRAVENOUS at 09:34

## 2022-09-07 RX ADMIN — DIATRIZOATE MEGLUMINE AND DIATRIZOATE SODIUM 15 ML: 660; 100 LIQUID ORAL; RECTAL at 09:34

## 2022-09-19 ENCOUNTER — HOSPITAL ENCOUNTER (OUTPATIENT)
Dept: GENERAL RADIOLOGY | Age: 67
Discharge: HOME | End: 2022-09-22
Payer: MEDICARE

## 2022-09-19 DIAGNOSIS — Z85.038 PERSONAL HISTORY OF COLON CANCER: ICD-10-CM

## 2022-09-19 DIAGNOSIS — Z12.11 ENCOUNTER FOR SCREENING FOR MALIGNANT NEOPLASM OF COLON: ICD-10-CM

## 2022-09-19 DIAGNOSIS — Z80.0 FAMILY HISTORY OF MALIGNANT NEOPLASM OF GASTROINTESTINAL TRACT: ICD-10-CM

## 2022-09-19 PROCEDURE — 6360000004 HC RX CONTRAST MEDICATION: Performed by: INTERNAL MEDICINE

## 2022-09-19 PROCEDURE — A4641 RADIOPHARM DX AGENT NOC: HCPCS | Performed by: INTERNAL MEDICINE

## 2022-09-19 PROCEDURE — 74270 X-RAY XM COLON 1CNTRST STD: CPT

## 2022-09-19 RX ADMIN — BARIUM SULFATE 950 ML: 1.05 SUSPENSION ORAL; RECTAL at 11:54

## 2022-10-06 DIAGNOSIS — D53.9 NUTRITIONAL ANEMIA, UNSPECIFIED: ICD-10-CM

## 2022-10-06 DIAGNOSIS — Z85.038 PERSONAL HISTORY OF OTHER MALIGNANT NEOPLASM OF LARGE INTESTINE: ICD-10-CM

## 2022-10-06 DIAGNOSIS — D64.9 ANEMIA, UNSPECIFIED TYPE: Primary | ICD-10-CM

## 2022-10-09 PROCEDURE — 93294 REM INTERROG EVL PM/LDLS PM: CPT | Performed by: INTERNAL MEDICINE

## 2022-10-09 PROCEDURE — 93296 REM INTERROG EVL PM/IDS: CPT | Performed by: INTERNAL MEDICINE

## 2022-10-12 ENCOUNTER — HOSPITAL ENCOUNTER (OUTPATIENT)
Dept: LAB | Age: 67
Discharge: HOME | End: 2022-10-15
Payer: MEDICARE

## 2022-10-12 ENCOUNTER — OFFICE VISIT (OUTPATIENT)
Dept: ONCOLOGY | Age: 67
End: 2022-10-12
Payer: MEDICARE

## 2022-10-12 VITALS
TEMPERATURE: 97.8 F | RESPIRATION RATE: 18 BRPM | BODY MASS INDEX: 40.14 KG/M2 | OXYGEN SATURATION: 96 % | WEIGHT: 271 LBS | HEART RATE: 80 BPM | HEIGHT: 69 IN | SYSTOLIC BLOOD PRESSURE: 137 MMHG | DIASTOLIC BLOOD PRESSURE: 96 MMHG

## 2022-10-12 DIAGNOSIS — D64.9 ANEMIA, UNSPECIFIED TYPE: ICD-10-CM

## 2022-10-12 DIAGNOSIS — Z85.038 PERSONAL HISTORY OF OTHER MALIGNANT NEOPLASM OF LARGE INTESTINE: ICD-10-CM

## 2022-10-12 DIAGNOSIS — D53.9 NUTRITIONAL ANEMIA, UNSPECIFIED: ICD-10-CM

## 2022-10-12 DIAGNOSIS — Z85.038 HISTORY OF COLON CANCER: Primary | ICD-10-CM

## 2022-10-12 DIAGNOSIS — R97.0 CARCINOEMBRYONIC ANTIGEN (CEA) ELEVATION: ICD-10-CM

## 2022-10-12 LAB
ALBUMIN SERPL-MCNC: 3.2 G/DL (ref 3.2–4.6)
ALBUMIN/GLOB SERPL: 0.8 {RATIO} (ref 0.4–1.6)
ALP SERPL-CCNC: 79 U/L (ref 50–136)
ALT SERPL-CCNC: 15 U/L (ref 12–65)
ANION GAP SERPL CALC-SCNC: 10 MMOL/L (ref 2–11)
AST SERPL-CCNC: 19 U/L (ref 15–37)
BASOPHILS # BLD: 0.1 K/UL (ref 0–0.2)
BASOPHILS NFR BLD: 1 % (ref 0–2)
BILIRUB SERPL-MCNC: 0.4 MG/DL (ref 0.2–1.1)
BUN SERPL-MCNC: 62 MG/DL (ref 8–23)
CALCIUM SERPL-MCNC: 8 MG/DL (ref 8.3–10.4)
CEA SERPL-MCNC: 13.7 NG/ML (ref 0–3)
CHLORIDE SERPL-SCNC: 97 MMOL/L (ref 101–110)
CO2 SERPL-SCNC: 31 MMOL/L (ref 21–32)
CREAT SERPL-MCNC: 5.8 MG/DL (ref 0.8–1.5)
DIFFERENTIAL METHOD BLD: ABNORMAL
EOSINOPHIL # BLD: 0.6 K/UL (ref 0–0.8)
EOSINOPHIL NFR BLD: 7 % (ref 0.5–7.8)
ERYTHROCYTE [DISTWIDTH] IN BLOOD BY AUTOMATED COUNT: 14.1 % (ref 11.9–14.6)
FERRITIN SERPL-MCNC: 1046 NG/ML (ref 8–388)
GLOBULIN SER CALC-MCNC: 4 G/DL (ref 2.8–4.5)
GLUCOSE SERPL-MCNC: 144 MG/DL (ref 65–100)
HCT VFR BLD AUTO: 41.5 %
HGB BLD-MCNC: 13 G/DL (ref 13.6–17.2)
IMM GRANULOCYTES # BLD AUTO: 0.1 K/UL (ref 0–0.5)
IMM GRANULOCYTES NFR BLD AUTO: 1 % (ref 0–5)
IRON SATN MFR SERPL: 35 %
IRON SERPL-MCNC: 100 UG/DL (ref 35–150)
LYMPHOCYTES # BLD: 0.8 K/UL (ref 0.5–4.6)
LYMPHOCYTES NFR BLD: 9 % (ref 13–44)
MCH RBC QN AUTO: 31.9 PG (ref 26.1–32.9)
MCHC RBC AUTO-ENTMCNC: 31.3 G/DL (ref 31.4–35)
MCV RBC AUTO: 101.7 FL (ref 82–102)
MONOCYTES # BLD: 0.6 K/UL (ref 0.1–1.3)
MONOCYTES NFR BLD: 8 % (ref 4–12)
NEUTS SEG # BLD: 6.4 K/UL (ref 1.7–8.2)
NEUTS SEG NFR BLD: 76 % (ref 43–78)
NRBC # BLD: 0 K/UL (ref 0–0.2)
PLATELET # BLD AUTO: 94 K/UL (ref 150–450)
PMV BLD AUTO: 13.8 FL (ref 9.4–12.3)
POTASSIUM SERPL-SCNC: 3.7 MMOL/L (ref 3.5–5.1)
PROT SERPL-MCNC: 7.2 G/DL (ref 6.3–8.2)
RBC # BLD AUTO: 4.08 M/UL (ref 4.23–5.6)
SODIUM SERPL-SCNC: 138 MMOL/L (ref 133–143)
TIBC SERPL-MCNC: 287 UG/DL (ref 250–450)
WBC # BLD AUTO: 8.4 K/UL (ref 4.3–11.1)

## 2022-10-12 PROCEDURE — 36415 COLL VENOUS BLD VENIPUNCTURE: CPT

## 2022-10-12 PROCEDURE — 80053 COMPREHEN METABOLIC PANEL: CPT

## 2022-10-12 PROCEDURE — 1036F TOBACCO NON-USER: CPT | Performed by: INTERNAL MEDICINE

## 2022-10-12 PROCEDURE — 82378 CARCINOEMBRYONIC ANTIGEN: CPT

## 2022-10-12 PROCEDURE — 3017F COLORECTAL CA SCREEN DOC REV: CPT | Performed by: INTERNAL MEDICINE

## 2022-10-12 PROCEDURE — 82728 ASSAY OF FERRITIN: CPT

## 2022-10-12 PROCEDURE — G8427 DOCREV CUR MEDS BY ELIG CLIN: HCPCS | Performed by: INTERNAL MEDICINE

## 2022-10-12 PROCEDURE — 1123F ACP DISCUSS/DSCN MKR DOCD: CPT | Performed by: INTERNAL MEDICINE

## 2022-10-12 PROCEDURE — 85025 COMPLETE CBC W/AUTO DIFF WBC: CPT

## 2022-10-12 PROCEDURE — 99214 OFFICE O/P EST MOD 30 MIN: CPT | Performed by: INTERNAL MEDICINE

## 2022-10-12 PROCEDURE — G8484 FLU IMMUNIZE NO ADMIN: HCPCS | Performed by: INTERNAL MEDICINE

## 2022-10-12 PROCEDURE — 83540 ASSAY OF IRON: CPT

## 2022-10-12 PROCEDURE — G8417 CALC BMI ABV UP PARAM F/U: HCPCS | Performed by: INTERNAL MEDICINE

## 2022-10-12 ASSESSMENT — PATIENT HEALTH QUESTIONNAIRE - PHQ9
6. FEELING BAD ABOUT YOURSELF - OR THAT YOU ARE A FAILURE OR HAVE LET YOURSELF OR YOUR FAMILY DOWN: 0
2. FEELING DOWN, DEPRESSED OR HOPELESS: 0
1. LITTLE INTEREST OR PLEASURE IN DOING THINGS: 0
7. TROUBLE CONCENTRATING ON THINGS, SUCH AS READING THE NEWSPAPER OR WATCHING TELEVISION: 0
SUM OF ALL RESPONSES TO PHQ9 QUESTIONS 1 & 2: 0
8. MOVING OR SPEAKING SO SLOWLY THAT OTHER PEOPLE COULD HAVE NOTICED. OR THE OPPOSITE, BEING SO FIGETY OR RESTLESS THAT YOU HAVE BEEN MOVING AROUND A LOT MORE THAN USUAL: 0
SUM OF ALL RESPONSES TO PHQ QUESTIONS 1-9: 0
4. FEELING TIRED OR HAVING LITTLE ENERGY: 0
SUM OF ALL RESPONSES TO PHQ QUESTIONS 1-9: 0
5. POOR APPETITE OR OVEREATING: 0
9. THOUGHTS THAT YOU WOULD BE BETTER OFF DEAD, OR OF HURTING YOURSELF: 0
SUM OF ALL RESPONSES TO PHQ QUESTIONS 1-9: 0
10. IF YOU CHECKED OFF ANY PROBLEMS, HOW DIFFICULT HAVE THESE PROBLEMS MADE IT FOR YOU TO DO YOUR WORK, TAKE CARE OF THINGS AT HOME, OR GET ALONG WITH OTHER PEOPLE: 0
SUM OF ALL RESPONSES TO PHQ QUESTIONS 1-9: 0
3. TROUBLE FALLING OR STAYING ASLEEP: 0

## 2022-10-12 ASSESSMENT — ANXIETY QUESTIONNAIRES
3. WORRYING TOO MUCH ABOUT DIFFERENT THINGS: 1
4. TROUBLE RELAXING: 2
5. BEING SO RESTLESS THAT IT IS HARD TO SIT STILL: 2
1. FEELING NERVOUS, ANXIOUS, OR ON EDGE: 1
6. BECOMING EASILY ANNOYED OR IRRITABLE: 0
7. FEELING AFRAID AS IF SOMETHING AWFUL MIGHT HAPPEN: 0
GAD7 TOTAL SCORE: 7
IF YOU CHECKED OFF ANY PROBLEMS ON THIS QUESTIONNAIRE, HOW DIFFICULT HAVE THESE PROBLEMS MADE IT FOR YOU TO DO YOUR WORK, TAKE CARE OF THINGS AT HOME, OR GET ALONG WITH OTHER PEOPLE: SOMEWHAT DIFFICULT
2. NOT BEING ABLE TO STOP OR CONTROL WORRYING: 1

## 2022-10-12 NOTE — PROGRESS NOTES
[facility]: Office Visit Progress Note     Chief Complaint:     History of colon cancer T3N0 status post resection in 2020      History of Present Illness:   Reason for Referral: History of colon cancer       Referring Provider:  Karen Bazan MD       Primary Care Provider: Karen Bazan MD       Family History of Cancer/Hematologic Disorders: Mother, maternal great aunt and uncle with colon cancer (all )       Presenting Symptoms: unknown      Mr. Marilee Lopez is a 69 y.o.  male who reports to be a former smoker of cigarettes for 10 years that quit in . He reports alcohol use  as social and reports drug use as not currently. His medical history reports as thrombocytopenia, intermittent AUSTIN, HTN, hypercholesterolemia, gout, depression, colon cancer, CKD, and A-fib. His surgical history reports as partial colectomy (), mitral  valve repair and gastric bypass. Mr. 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 He elected for surveillance after surgery in 2020 for right hemicolectomy at [facility] in [place]. His history of AUSTIN was reported to be related to his chronic heart failure, renal disease and poor absorption due to gastric bypass and colon surgery. He reports he received IV iron infusion  in . His thrombocytopenia is reported related to his heart failure. He reported taking oral iron supplement and receiving Procrit from his nephrologist starting in 2021. His was last seen by Dr. Barrera Nearing in 2021. At that visit he reported  being admitted to hospital for acute on chronic kidney disease and heart failure that required him to start hemodialysis. Plan was for clinic follow up in 6 months with restaging CT scan of c/a/p with oral contrast and CBC, CMP with CEA.    His 2021 CBC reported a decreased RBC of 3.93 and platelet count of 757O with a normal hemoglobin of 12.1. His 11/2021 chemistry panel reported a BUN of 104 (prior had ran approximately 50) and creatinine of 4.53 (had ran 2.2-3.4). His LDH in June 2021 reported normal at 200 and SPEP reported no M-spike detected. His 6/2021 serum free light chains reported an elevated free KL of 73, free LC of 43 and k/l ratio of 1.71. His 8/2021 iron studies reported an iron saturation of 25%, ferritin of 79,  iron of 75, and TIBC of 300. His 11/2021 CEA reported elevated at 5.6 and previously had reported 4.4 to 4.8. In February 2022, Mr. Radha Bee presented to Dr. David Mcclelland to establish PCP care after relocating from [facility] to [place] [address]. He reports to be on hemodialysis times a week for his ESRD. He reports to be on Eliquis per cardiology and a referral was placed to a local cardiologist to assume his care. His last colonoscopy was in July 2021 and pathology reported 7 transvers polyps, 1 sigmoid polyp, 1 descending polyp and 1 rectal polyp as tubular adenomas. His last CT scan of chest, abdomen and pelvis was in 10/2021 and reported no evidence of local  recurrence or metastatic disease. A referral was placed to medical oncology for continuation of care for his history of colon cancer. On 2/25/22 he underwent a peritoneal dialysis catheter revision by Dr Sharita Monroy- it is unclear if he is currently on peritoneal dialysis or hemodialysis. His 2/2022 Gin CBC reported a decreased hemoglobin of 10.3, hematocrit of 32 and RBC of 3.28 with  an elevated MPV of 11.8 and normal platelet count of 243A. 5/30/2020 Pathology        6/2/2020 Pathology                 10/2/2020 CT Chest, Abdomen and Pelvis                7/22/2021 Pathology            10/19/2021 CT Chest Abdomen and Pelvis                 Labs                                 Notes from Referring Provider: \"History of colon cancer 2020, treated with hemicolectomy, signed release for records. \" Other Pertinent Information: CT scans with oral contrast only because of renal function; Wife is currently under treatment with Dr Herve Jon  for stage 4 ovarian cancer with poor prognosis/palliative treatment          Review of Systems:      Constitutional  Denies fever or chills. Denies weight loss or appetite changes. Denies fatigue. Denies anorexia. HEENT  Denies trauma, bluring vision, hearing loss, ear pain, nosebleeds, sore throat, neck pain and ear discharge. Skin  Denies lesions or rashes. Lungs  Denies shortness of breath, cough, sputum production or hemoptysis. Cardiovascular  Denies chest pain, palpitations, orthopnea, claudication and leg swelling. Gastrointestinal  Denies nausea, vomiting, bowel changes. Denies bloody or black stools. Denies abdominal pain.   Denies dysuria, frequency or hesitancy of urination     Neuro  Denies headaches, visual changes or ataxia. Denies dizziness, tingling, tremors, sensory change, speech change, focal weakness and headaches. Hematology  Denies nasal/gum bleeding, denies easy bruise     Endo  Denies heat/cold intolerance, denies diabetes. MSK  Denies back pain, swollen legs, myalgias and falls. Psychiatric/Behavioral  Denies depression and substance abuse. The patient is not nervous/anxious.         No Known Allergies  Past Medical History:   Diagnosis Date    A-fib ([address]     [facility]     Chronic kidney disease     Colon cancer ([address]     2020    Complete heart block ([address]     Depression     Hemodialysis patient ([facility] [address]     Hypercholesterolemia     Hypertension      Past Surgical History:   Procedure Laterality Date    AV FISTULA PLACEMENT      COLONOSCOPY N/A 8/23/2022    COLONOSCOPY POLYPECTOMY SNARE/COLD BIOPSY performed by Garrett Mantilla MD at [facility].      HX PARTIAL COLECTOMY      2020    MITRAL VALVE REPAIR  2018    OTHER SURGICAL HISTORY      mitral valve repair PACEMAKER PLACEMENT      UPPER GASTROINTESTINAL ENDOSCOPY N/A 2022    EGD BIOPSY performed by Tomasa Loera MD at [facility] ENDOSCOPY     Family History   Problem Relation Age of Onset    Alzheimer's Disease Sister     Colon Cancer Mother     Heart Disease Father     Kidney Disease Sister      Social History     Socioeconomic History    Marital status:      Spouse name: Not on file    Number of children: Not on file    Years of education: Not on file    Highest education level: Not on file   Occupational History    Not on file   Tobacco Use    Smoking status: Former     Types: Cigarettes     Quit date: 1984     Years since quittin.8    Smokeless tobacco: Never   Substance and Sexual Activity    Alcohol use: Yes    Drug use: Not Currently    Sexual activity: Not on file   Other Topics Concern    Not on file   Social History Narrative    Not on file     Social Determinants of Health     Financial Resource Strain: Not on file   Food Insecurity: Not on file   Transportation Needs: Not on file   Physical Activity: Not on file   Stress: Not on file   Social Connections: Not on file   Intimate Partner Violence: Not on file   Housing Stability: Not on file     Current Outpatient Medications   Medication Sig Dispense Refill    carvedilol (COREG) 12.5 MG tablet 2 times daily      calcitRIOL (ROCALTROL) 0.5 MCG capsule Take 0.5 mcg by mouth daily      Cyanocobalamin 1000 MCG CAPS Cyanocobalamin (vitamin B-12)      escitalopram (LEXAPRO) 10 MG tablet Take 10 mg by mouth daily      simvastatin (ZOCOR) 20 MG tablet Take by mouth nightly      naloxone 4 MG/0.1ML LIQD nasal spray 1 spray by Nasal route as needed for Opioid Reversal       No current facility-administered medications for this visit.        OBJECTIVE:  BP (!) 137/96   Pulse 80   Temp 97.8 °F (36.6 °C)   Resp 18   Ht 5' 9\" (1.753 m)   Wt 271 lb (122.9 kg)   SpO2 96%   BMI 40.02 kg/m²     Physical Exam:  Constitutional: Oriented to person, place, and time. Well-developed and well-nourished. HEENT: Normocephalic and atraumatic. Oropharynx is clear and moist.   Conjunctivae and EOM are normal. Pupils are equal, round, and reactive to light. No scleral icterus. Neck supple. No JVD present. No tracheal deviation present. No thyromegaly present. Lymph node No palpable submandibular, cervical, supraclavicular, axillary and inguinal lymph nodes. Skin Warm and dry. No bruising and no rash noted. No erythema. No pallor. Respiratory Effort normal and breath sounds normal.  No respiratory distress. No wheezes. No rales. No tenderness. CVS Normal rate, regular rhythm and normal heart sounds. Exam reveals no gallop, no friction and no rub. No murmur heard. Abdomen Soft. Bowel sounds are normal. Exhibits no distension. There is no tenderness. There is no rebound and no guarding. Neuro Normal reflexes. No cranial nerve deficit. Exhibits normal muscle tone, 5 of 5 strength of all extremities. MSK Normal range of motion in general.  No edema and no tenderness.    Psych Normal mood, affect, behavior, judgment and thought content      Labs:  Recent Results (from the past 24 hour(s))   CBC with Auto Differential    Collection Time: 10/12/22 10:35 AM   Result Value Ref Range    WBC 8.4 4.3 - 11.1 K/uL    RBC 4.08 (L) 4.23 - 5.6 M/uL    Hemoglobin 13.0 (L) 13.6 - 17.2 g/dL    Hematocrit 41.5 %    .7 82.0 - 102.0 FL    MCH 31.9 26.1 - 32.9 PG    MCHC 31.3 (L) 31.4 - 35.0 g/dL    RDW 14.1 11.9 - 14.6 %    Platelets 94 (L) 570 - 450 K/uL    MPV 13.8 (H) 9.4 - 12.3 FL    nRBC 0.00 0.0 - 0.2 K/uL    Differential Type AUTOMATED      Seg Neutrophils 76 43 - 78 %    Lymphocytes 9 (L) 13 - 44 %    Monocytes 8 4.0 - 12.0 %    Eosinophils % 7 0.5 - 7.8 %    Basophils 1 0.0 - 2.0 %    Immature Granulocytes 1 0.0 - 5.0 %    Segs Absolute 6.4 1.7 - 8.2 K/UL    Absolute Lymph # 0.8 0.5 - 4.6 K/UL    Absolute Mono # 0.6 0.1 - 1.3 K/UL    Absolute Eos # 0.6 0.0 - 0.8 K/UL    Basophils Absolute 0.1 0.0 - 0.2 K/UL    Absolute Immature Granulocyte 0.1 0.0 - 0.5 K/UL   Comprehensive Metabolic Panel    Collection Time: 10/12/22 10:35 AM   Result Value Ref Range    Sodium 138 133 - 143 mmol/L    Potassium 3.7 3.5 - 5.1 mmol/L    Chloride 97 (L) 101 - 110 mmol/L    CO2 31 21 - 32 mmol/L    Anion Gap 10 2 - 11 mmol/L    Glucose 144 (H) 65 - 100 mg/dL    BUN 62 (H) 8 - 23 MG/DL    Creatinine 5.80 (H) 0.8 - 1.5 MG/DL    Est, Glom Filt Rate 10 (L) >60 ml/min/1.73m2    Calcium 8.0 (L) 8.3 - 10.4 MG/DL    Total Bilirubin 0.4 0.2 - 1.1 MG/DL    ALT 15 12 - 65 U/L    AST 19 15 - 37 U/L    Alk Phosphatase 79 50 - 136 U/L    Total Protein 7.2 6.3 - 8.2 g/dL    Albumin 3.2 3.2 - 4.6 g/dL    Globulin 4.0 2.8 - 4.5 g/dL    Albumin/Globulin Ratio 0.8 0.4 - 1.6     Ferritin    Collection Time: 10/12/22 10:35 AM   Result Value Ref Range    Ferritin 1,046 (H) 8 - 388 NG/ML   Transferrin Saturation    Collection Time: 10/12/22 10:35 AM   Result Value Ref Range    Iron 100 35 - 150 ug/dL    TIBC 287 250 - 450 ug/dL    TRANSFERRIN SATURATION 35 >20 %   CEA    Collection Time: 10/12/22 10:35 AM   Result Value Ref Range    CEA 13.7 (H) 0.0 - 3.0 ng/mL       Imaging:  No results found for this or any previous visit. ASSESSMENT/PLAN:   Diagnosis Orders   1. History of colon cancer  CBC with Auto Differential    Comprehensive Metabolic Panel    CEA    Ferritin    Transferrin Saturation      2. Anemia, unspecified type        3. Carcinoembryonic antigen (CEA) elevation          69 y.o. M consulted for colon cancer and presented to [facility] on 3/14/2022. He was diagnosed of stage II colon cancer and received  hemicolectomy in [facility] in 6/2020, no adjuvant chemotherapy was given and surveillance colonoscopy in 7/2021, 7 polyps are removed and confirmed benign, surveillance CT in 10/2021 showed no evidence of disease.   We discussed continue  the surveillance protocol and repeat

## 2022-10-12 NOTE — PATIENT INSTRUCTIONS
Patient Instructions from Today's Visit    Reason for Visit:  Follow up-Colon    Diagnosis Information:  [email or web address]      Plan:  The polyps they took out were benign. We will like to see you sooner next time due to the rise of your CEA.   Follow Up:  Dr Jeovanny Celis in 2-3 months    Recent Lab Results:  Hospital Outpatient Visit on 10/12/2022   Component Date Value Ref Range Status    WBC 10/12/2022 8.4  4.3 - 11.1 K/uL Final    RESULTS CHECKED X 2    RBC 10/12/2022 4.08 (A)  4.23 - 5.6 M/uL Final    Hemoglobin 10/12/2022 13.0 (A)  13.6 - 17.2 g/dL Final    Hematocrit 10/12/2022 41.5  % Final    MCV 10/12/2022 101.7  82.0 - 102.0 FL Final    MCH 10/12/2022 31.9  26.1 - 32.9 PG Final    MCHC 10/12/2022 31.3 (A)  31.4 - 35.0 g/dL Final    RDW 10/12/2022 14.1  11.9 - 14.6 % Final    Platelets 44/40/3739 94 (A)  150 - 450 K/uL Final    MPV 10/12/2022 13.8 (A)  9.4 - 12.3 FL Final    nRBC 10/12/2022 0.00  0.0 - 0.2 K/uL Final    **Note: Absolute NRBC parameter is now reported with Hemogram**    Differential Type 10/12/2022 AUTOMATED    Final    Seg Neutrophils 10/12/2022 76  43 - 78 % Final    Lymphocytes 10/12/2022 9 (A)  13 - 44 % Final    Monocytes 10/12/2022 8  4.0 - 12.0 % Final    Eosinophils % 10/12/2022 7  0.5 - 7.8 % Final    Basophils 10/12/2022 1  0.0 - 2.0 % Final    Immature Granulocytes 10/12/2022 1  0.0 - 5.0 % Final    Segs Absolute 10/12/2022 6.4  1.7 - 8.2 K/UL Final    Absolute Lymph # 10/12/2022 0.8  0.5 - 4.6 K/UL Final    Absolute Mono # 10/12/2022 0.6  0.1 - 1.3 K/UL Final    Absolute Eos # 10/12/2022 0.6  0.0 - 0.8 K/UL Final    Basophils Absolute 10/12/2022 0.1  0.0 - 0.2 K/UL Final    Absolute Immature Granulocyte 10/12/2022 0.1  0.0 - 0.5 K/UL Final    Sodium 10/12/2022 138  133 - 143 mmol/L Final    Potassium 10/12/2022 3.7  3.5 - 5.1 mmol/L Final    Chloride 10/12/2022 97 (A)  101 - 110 mmol/L Final    CO2 10/12/2022 31  21 - 32 mmol/L Final    Anion Gap 10/12/2022 10  2 - 11 mmol/L Final    Glucose 10/12/2022 144 (A)  65 - 100 mg/dL Final    BUN 10/12/2022 62 (A)  8 - 23 MG/DL Final    Creatinine 10/12/2022 5.80 (A)  0.8 - 1.5 MG/DL Final    Est, Glom Filt Rate 10/12/2022 10 (A)  >60 ml/min/1.73m2 Final    Comment:      Pediatric calculator link: [employer].at. org/professionals/kdoqi/gfr_calculatorped       Effective Oct 3, 2022       These results are not intended for use in patients <25years of age. eGFR results are calculated without a race factor using  the 2021 CKD-EPI equation. Careful clinical correlation is recommended, particularly when comparing to results calculated using previous equations. The CKD-EPI equation is less accurate in patients with extremes of muscle mass, extra-renal metabolism of creatinine, excessive creatine ingestion, or following therapy that affects renal tubular secretion. Calcium 10/12/2022 8.0 (A)  8.3 - 10.4 MG/DL Final    Total Bilirubin 10/12/2022 0.4  0.2 - 1.1 MG/DL Final    ALT 10/12/2022 15  12 - 65 U/L Final    AST 10/12/2022 19  15 - 37 U/L Final    Alk Phosphatase 10/12/2022 79  50 - 136 U/L Final    Total Protein 10/12/2022 7.2  6.3 - 8.2 g/dL Final    Albumin 10/12/2022 3.2  3.2 - 4.6 g/dL Final    Globulin 10/12/2022 4.0  2.8 - 4.5 g/dL Final    Albumin/Globulin Ratio 10/12/2022 0.8  0.4 - 1.6   Final    Ferritin 10/12/2022 1,046 (A)  8 - 388 NG/ML Final    Iron 10/12/2022 100  35 - 150 ug/dL Final    Comment: Known Interfering Substances section:  \"Iron values may be falsely elevated in  serum samples from patients with  anticoagulants (e.g., hemodialysis patients). \"  Limitations of Procedure section:  \"Turbidity resulting from precipitation of  fibrinogen in the serum of patients treated  with anticoagulants (e.g. hemodialysis  patients) may cause spuriously elevated  iron results. \"      TIBC 10/12/2022 287  250 - 450 ug/dL Final    TRANSFERRIN SATURATION 10/12/2022 35  >20 % Final    CEA 10/12/2022 13.7 (A)  0.0 - 3.0 ng/mL Final    Comment: Nonsmoker:  <3.0 ng/mL  Smoker:     <5.0 ng/mL  [facility]. Patient's results of tumor marker testing may not be comparable to labs using different manufacturers/methods. Treatment Summary has been discussed and given to patient: n/a        -------------------------------------------------------------------------------------------------------------------  Please call our office at [phone] if you have any  of the following symptoms:   Fever of 100.5 or greater  Chills  Shortness of breath  Swelling or pain in one leg    After office hours an answering service is available and will contact a provider for emergencies or if you are experiencing any of the above symptoms. Patient does express an interest in My Chart. My Chart log in information explained on the after visit summary printout at the [facility] [address] [address] desk.     [place], [place]

## 2022-11-04 DIAGNOSIS — I44.2 CHB (COMPLETE HEART BLOCK) (HCC): ICD-10-CM

## 2022-11-04 DIAGNOSIS — I44.2 CHB (COMPLETE HEART BLOCK) (HCC): Primary | ICD-10-CM

## 2022-11-04 DIAGNOSIS — I48.21 PERMANENT ATRIAL FIBRILLATION (HCC): ICD-10-CM

## 2022-11-04 DIAGNOSIS — Z95.0 S/P PLACEMENT OF CARDIAC PACEMAKER: ICD-10-CM

## 2022-12-16 ENCOUNTER — CLINICAL DOCUMENTATION (OUTPATIENT)
Dept: CARDIOLOGY CLINIC | Age: 67
End: 2022-12-16

## 2022-12-16 NOTE — PROGRESS NOTES
Eliquis PA submitted on 12/15/2022. Approved on December 15  Request Reference Number: [id number]. ELIQUIS TAB 5MG is approved through 12/31/2023.

## 2023-04-04 ENCOUNTER — OFFICE VISIT (OUTPATIENT)
Dept: FAMILY MEDICINE CLINIC | Facility: CLINIC | Age: 68
End: 2023-04-04
Payer: MEDICARE

## 2023-04-04 VITALS
DIASTOLIC BLOOD PRESSURE: 60 MMHG | WEIGHT: 268.6 LBS | BODY MASS INDEX: 37.6 KG/M2 | SYSTOLIC BLOOD PRESSURE: 132 MMHG | HEART RATE: 71 BPM | OXYGEN SATURATION: 95 % | HEIGHT: 71 IN

## 2023-04-04 DIAGNOSIS — F32.2 SEVERE DEPRESSION (HCC): Primary | ICD-10-CM

## 2023-04-04 DIAGNOSIS — I48.21 PERMANENT ATRIAL FIBRILLATION (HCC): ICD-10-CM

## 2023-04-04 DIAGNOSIS — E66.01 SEVERE OBESITY (BMI 35.0-39.9) WITH COMORBIDITY (HCC): ICD-10-CM

## 2023-04-04 DIAGNOSIS — M17.0 PRIMARY OSTEOARTHRITIS OF BOTH KNEES: ICD-10-CM

## 2023-04-04 DIAGNOSIS — N18.6 ESRD (END STAGE RENAL DISEASE) ON DIALYSIS (HCC): ICD-10-CM

## 2023-04-04 DIAGNOSIS — Z99.2 ESRD (END STAGE RENAL DISEASE) ON DIALYSIS (HCC): ICD-10-CM

## 2023-04-04 PROBLEM — D69.6 THROMBOCYTOPENIA (HCC): Status: ACTIVE | Noted: 2022-03-11

## 2023-04-04 PROCEDURE — 1036F TOBACCO NON-USER: CPT | Performed by: FAMILY MEDICINE

## 2023-04-04 PROCEDURE — 3017F COLORECTAL CA SCREEN DOC REV: CPT | Performed by: FAMILY MEDICINE

## 2023-04-04 PROCEDURE — 3075F SYST BP GE 130 - 139MM HG: CPT | Performed by: FAMILY MEDICINE

## 2023-04-04 PROCEDURE — 99214 OFFICE O/P EST MOD 30 MIN: CPT | Performed by: FAMILY MEDICINE

## 2023-04-04 PROCEDURE — 3078F DIAST BP <80 MM HG: CPT | Performed by: FAMILY MEDICINE

## 2023-04-04 PROCEDURE — G8427 DOCREV CUR MEDS BY ELIG CLIN: HCPCS | Performed by: FAMILY MEDICINE

## 2023-04-04 PROCEDURE — 1123F ACP DISCUSS/DSCN MKR DOCD: CPT | Performed by: FAMILY MEDICINE

## 2023-04-04 PROCEDURE — G8417 CALC BMI ABV UP PARAM F/U: HCPCS | Performed by: FAMILY MEDICINE

## 2023-04-04 RX ORDER — ESCITALOPRAM OXALATE 5 MG/1
5 TABLET ORAL DAILY
Qty: 90 TABLET | Refills: 1 | Status: SHIPPED | OUTPATIENT
Start: 2023-04-04

## 2023-04-04 SDOH — ECONOMIC STABILITY: FOOD INSECURITY: WITHIN THE PAST 12 MONTHS, THE FOOD YOU BOUGHT JUST DIDN'T LAST AND YOU DIDN'T HAVE MONEY TO GET MORE.: NEVER TRUE

## 2023-04-04 SDOH — ECONOMIC STABILITY: HOUSING INSECURITY
IN THE LAST 12 MONTHS, WAS THERE A TIME WHEN YOU DID NOT HAVE A STEADY PLACE TO SLEEP OR SLEPT IN A SHELTER (INCLUDING NOW)?: NO

## 2023-04-04 SDOH — ECONOMIC STABILITY: FOOD INSECURITY: WITHIN THE PAST 12 MONTHS, YOU WORRIED THAT YOUR FOOD WOULD RUN OUT BEFORE YOU GOT MONEY TO BUY MORE.: NEVER TRUE

## 2023-04-04 SDOH — ECONOMIC STABILITY: INCOME INSECURITY: HOW HARD IS IT FOR YOU TO PAY FOR THE VERY BASICS LIKE FOOD, HOUSING, MEDICAL CARE, AND HEATING?: NOT VERY HARD

## 2023-04-04 ASSESSMENT — COLUMBIA-SUICIDE SEVERITY RATING SCALE - C-SSRS
6. HAVE YOU EVER DONE ANYTHING, STARTED TO DO ANYTHING, OR PREPARED TO DO ANYTHING TO END YOUR LIFE?: NO
4. HAVE YOU HAD THESE THOUGHTS AND HAD SOME INTENTION OF ACTING ON THEM?: YES
1. WITHIN THE PAST MONTH, HAVE YOU WISHED YOU WERE DEAD OR WISHED YOU COULD GO TO SLEEP AND NOT WAKE UP?: YES
5. HAVE YOU STARTED TO WORK OUT OR WORKED OUT THE DETAILS OF HOW TO KILL YOURSELF? DO YOU INTEND TO CARRY OUT THIS PLAN?: NO
3. HAVE YOU BEEN THINKING ABOUT HOW YOU MIGHT KILL YOURSELF?: YES
2. HAVE YOU ACTUALLY HAD ANY THOUGHTS OF KILLING YOURSELF?: YES

## 2023-04-04 ASSESSMENT — PATIENT HEALTH QUESTIONNAIRE - PHQ9
SUM OF ALL RESPONSES TO PHQ QUESTIONS 1-9: 16
SUM OF ALL RESPONSES TO PHQ QUESTIONS 1-9: 16
SUM OF ALL RESPONSES TO PHQ QUESTIONS 1-9: 13
7. TROUBLE CONCENTRATING ON THINGS, SUCH AS READING THE NEWSPAPER OR WATCHING TELEVISION: 0
4. FEELING TIRED OR HAVING LITTLE ENERGY: 3
8. MOVING OR SPEAKING SO SLOWLY THAT OTHER PEOPLE COULD HAVE NOTICED. OR THE OPPOSITE, BEING SO FIGETY OR RESTLESS THAT YOU HAVE BEEN MOVING AROUND A LOT MORE THAN USUAL: 0
9. THOUGHTS THAT YOU WOULD BE BETTER OFF DEAD, OR OF HURTING YOURSELF: 3
10. IF YOU CHECKED OFF ANY PROBLEMS, HOW DIFFICULT HAVE THESE PROBLEMS MADE IT FOR YOU TO DO YOUR WORK, TAKE CARE OF THINGS AT HOME, OR GET ALONG WITH OTHER PEOPLE: 3
1. LITTLE INTEREST OR PLEASURE IN DOING THINGS: 3
6. FEELING BAD ABOUT YOURSELF - OR THAT YOU ARE A FAILURE OR HAVE LET YOURSELF OR YOUR FAMILY DOWN: 3
2. FEELING DOWN, DEPRESSED OR HOPELESS: 3
5. POOR APPETITE OR OVEREATING: 0
SUM OF ALL RESPONSES TO PHQ9 QUESTIONS 1 & 2: 6
3. TROUBLE FALLING OR STAYING ASLEEP: 1
SUM OF ALL RESPONSES TO PHQ QUESTIONS 1-9: 16

## 2023-04-04 ASSESSMENT — ENCOUNTER SYMPTOMS
SHORTNESS OF BREATH: 0
CHEST TIGHTNESS: 0

## 2023-04-04 NOTE — PROGRESS NOTES
disease on dialysis. He is currently taking no other medications. Patient's wife has ovarian cancer and recently was transitioned to hospice. He reports for the past month he has had increased depression with suicidal thoughts. He did have insight to remove all firearms from the home. He has thought about walking in front of a train. States he believes in God and will not hurt himself. He has extended family close by that is helping him. He plans to receive therapy through hospice team.  He also has a  at dialysis that is helpful. He also reports worsening bilateral knee pain. Review of Systems   Constitutional:  Negative for unexpected weight change. Respiratory:  Negative for chest tightness and shortness of breath. Cardiovascular:  Negative for chest pain. Musculoskeletal:  Positive for arthralgias. Psychiatric/Behavioral:  Negative for hallucinations and sleep disturbance. The patient is nervous/anxious. /60   Pulse 71   Ht 5' 11\" (1.803 m)   Wt 268 lb 9.6 oz (121.8 kg)   SpO2 95%   BMI 37.46 kg/m²     Physical Exam  Constitutional:       General: He is not in acute distress. Appearance: He is obese. He is not ill-appearing. Pulmonary:      Effort: Pulmonary effort is normal.   Neurological:      Mental Status: He is alert. Psychiatric:         Attention and Perception: Attention and perception normal.         Mood and Affect: Affect normal. Mood is depressed. Speech: Speech normal.         Behavior: Behavior normal.         Thought Content: Thought content includes suicidal ideation. Thought content does not include suicidal plan. Cognition and Memory: Cognition and memory normal.         Judgment: Judgment normal.        1. Severe depression ([facility] [facility]  Severe depression with suicidal thoughts. Contracts for safety. Encouraged to use hospice counseling services. Restart lexapro. -     escitalopram (LEXAPRO) 5 MG tablet;  Take 1 tablet

## 2023-05-17 ENCOUNTER — OFFICE VISIT (OUTPATIENT)
Dept: ORTHOPEDIC SURGERY | Age: 68
End: 2023-05-17

## 2023-05-17 VITALS — WEIGHT: 273.01 LBS | BODY MASS INDEX: 41.38 KG/M2 | HEIGHT: 68 IN

## 2023-05-17 DIAGNOSIS — M25.561 ACUTE PAIN OF BOTH KNEES: Primary | ICD-10-CM

## 2023-05-17 DIAGNOSIS — E66.01 MORBID OBESITY (HCC): ICD-10-CM

## 2023-05-17 DIAGNOSIS — M17.12 PRIMARY OSTEOARTHRITIS OF LEFT KNEE: ICD-10-CM

## 2023-05-17 DIAGNOSIS — M17.11 PRIMARY OSTEOARTHRITIS OF RIGHT KNEE: ICD-10-CM

## 2023-05-17 DIAGNOSIS — M25.562 ACUTE PAIN OF BOTH KNEES: Primary | ICD-10-CM

## 2023-05-17 RX ORDER — METHYLPREDNISOLONE ACETATE 40 MG/ML
40 INJECTION, SUSPENSION INTRA-ARTICULAR; INTRALESIONAL; INTRAMUSCULAR; SOFT TISSUE ONCE
Status: COMPLETED | OUTPATIENT
Start: 2023-05-17 | End: 2023-05-17

## 2023-05-17 RX ADMIN — METHYLPREDNISOLONE ACETATE 40 MG: 40 INJECTION, SUSPENSION INTRA-ARTICULAR; INTRALESIONAL; INTRAMUSCULAR; SOFT TISSUE at 10:56

## 2023-05-17 NOTE — PROGRESS NOTES
Name: Gianni Santos YOB: 1955  Gender: male  MRN: [id number]    CC: Bilateral knee pain left worse than right    HPI: Gianni Santos is a 77 y.o. male who presents with longstanding progressive bilateral knee pain left worse than right. He states he is really never had these evaluated. He does have a significant medical history which includes chronic hemodialysis for renal failure for greater than 2 years, history of colon cancer, history of atrial fibrillation, and previous significant cardiovascular history with mitral valve repair. Additionally he does have morbid obesity with a BMI over 41. He is a limited community ambulator with the use of a cane. He does wear braces for both knees which he does find helpful. His wife is in hospice and he is her primary caregiver. He has been doing more lifting and this has aggravated his back. With this constellation of issues and concerns with both knees he comes in today for further recommendations and treatment. History was obtained from patient    ROS/Meds/PSH/PMH/FH/SH: I personally reviewed the patients standard intake form. Below are the pertinents    Tobacco:  reports that he quit smoking about 39 years ago. His smoking use included cigarettes. He has never been exposed to tobacco smoke.  He has never used smokeless tobacco.  Past Medical History:   Diagnosis Date    A-fib ([address]     Cancer ([address]     Chronic kidney disease     Colon cancer ([address]     2020    Complete heart block ([address]     Depression     Hemodialysis patient ([address]     Hypercholesterolemia     Hypertension       Past Surgical History:   Procedure Laterality Date    AV FISTULA PLACEMENT      COLONOSCOPY N/A 8/23/2022    COLONOSCOPY POLYPECTOMY SNARE/COLD BIOPSY performed by Elie Deras MD at [facility]      2020    MITRAL VALVE REPAIR  2018    OTHER SURGICAL HISTORY      mitral valve repair

## 2023-06-02 ENCOUNTER — TELEMEDICINE (OUTPATIENT)
Dept: FAMILY MEDICINE CLINIC | Facility: CLINIC | Age: 68
End: 2023-06-02
Payer: MEDICARE

## 2023-06-02 DIAGNOSIS — D69.6 THROMBOCYTOPENIA (HCC): ICD-10-CM

## 2023-06-02 DIAGNOSIS — F32.2 SEVERE DEPRESSION (HCC): ICD-10-CM

## 2023-06-02 PROCEDURE — 3017F COLORECTAL CA SCREEN DOC REV: CPT | Performed by: FAMILY MEDICINE

## 2023-06-02 PROCEDURE — 1123F ACP DISCUSS/DSCN MKR DOCD: CPT | Performed by: FAMILY MEDICINE

## 2023-06-02 PROCEDURE — G8427 DOCREV CUR MEDS BY ELIG CLIN: HCPCS | Performed by: FAMILY MEDICINE

## 2023-06-02 PROCEDURE — 99213 OFFICE O/P EST LOW 20 MIN: CPT | Performed by: FAMILY MEDICINE

## 2023-06-02 RX ORDER — ESCITALOPRAM OXALATE 10 MG/1
10 TABLET ORAL DAILY
Qty: 90 TABLET | Refills: 2 | Status: SHIPPED | OUTPATIENT
Start: 2023-06-02

## 2023-06-02 ASSESSMENT — PATIENT HEALTH QUESTIONNAIRE - PHQ9
SUM OF ALL RESPONSES TO PHQ QUESTIONS 1-9: 5
8. MOVING OR SPEAKING SO SLOWLY THAT OTHER PEOPLE COULD HAVE NOTICED. OR THE OPPOSITE, BEING SO FIGETY OR RESTLESS THAT YOU HAVE BEEN MOVING AROUND A LOT MORE THAN USUAL: 0
SUM OF ALL RESPONSES TO PHQ QUESTIONS 1-9: 5
SUM OF ALL RESPONSES TO PHQ QUESTIONS 1-9: 5
4. FEELING TIRED OR HAVING LITTLE ENERGY: 3
1. LITTLE INTEREST OR PLEASURE IN DOING THINGS: 0
SUM OF ALL RESPONSES TO PHQ QUESTIONS 1-9: 5
SUM OF ALL RESPONSES TO PHQ9 QUESTIONS 1 & 2: 1
2. FEELING DOWN, DEPRESSED OR HOPELESS: 1
6. FEELING BAD ABOUT YOURSELF - OR THAT YOU ARE A FAILURE OR HAVE LET YOURSELF OR YOUR FAMILY DOWN: 1
10. IF YOU CHECKED OFF ANY PROBLEMS, HOW DIFFICULT HAVE THESE PROBLEMS MADE IT FOR YOU TO DO YOUR WORK, TAKE CARE OF THINGS AT HOME, OR GET ALONG WITH OTHER PEOPLE: 0
7. TROUBLE CONCENTRATING ON THINGS, SUCH AS READING THE NEWSPAPER OR WATCHING TELEVISION: 0
9. THOUGHTS THAT YOU WOULD BE BETTER OFF DEAD, OR OF HURTING YOURSELF: 0
3. TROUBLE FALLING OR STAYING ASLEEP: 0
5. POOR APPETITE OR OVEREATING: 0

## 2023-06-02 NOTE — PROGRESS NOTES
[address] (:  1955) is a Established patient, presenting virtually for evaluation of the following:    Assessment & Plan   Below is the assessment and plan developed based on review of pertinent history, physical exam, labs, studies, and medications. 1. Severe depression (HCC)  Increasing dose of lexapro. Patient is of sound mind to make his own decision regarding continuing dialysis or discontinuing. Recommended reaching out to office if needed. -     escitalopram (LEXAPRO) 10 MG tablet; Take 1 tablet by mouth daily, Disp-90 tablet, R-2Normal  2. Thrombocytopenia ([address]  No acute concerns today. No follow-ups on file. Subjective   Patient is a 77year old male with history of end stage renal disease on dialysis. Patient's wife has ovarian cancer and recently was [facility]ed to [facility]. Last seen in April for depression and suicidal thoughts. He was started on lexapro at that time. He denies side effects from medication. He does feel medication is helping. He has several family members helping him. He has thought about discontinuing dialysis after his wife dies and switching to hospice care. He has talked about this decision with his family and they are supportive. States he is not making this decision based on sadness. He is making it based on fatigue. This week had an episode where access leaked. He had to go to the ER to stop the bleeding. Review of Systems   Constitutional:  Positive for fatigue. Psychiatric/Behavioral:  Negative for hallucinations and suicidal ideas. Objective   Patient-Reported Vitals  No data recorded     Physical Exam  Vitals reviewed. Constitutional:       Appearance: Normal appearance. Neurological:      Mental Status: He is alert. Psychiatric:         Attention and Perception: Attention normal.         Mood and Affect: Affect normal. Mood is depressed.          Speech: Speech normal.         Behavior: Behavior

## 2023-06-07 ENCOUNTER — OFFICE VISIT (OUTPATIENT)
Dept: ORTHOPEDIC SURGERY | Age: 68
End: 2023-06-07
Payer: MEDICARE

## 2023-06-07 DIAGNOSIS — M17.11 PRIMARY OSTEOARTHRITIS OF RIGHT KNEE: Primary | ICD-10-CM

## 2023-06-07 DIAGNOSIS — M17.12 PRIMARY OSTEOARTHRITIS OF LEFT KNEE: ICD-10-CM

## 2023-06-07 PROCEDURE — 20611 DRAIN/INJ JOINT/BURSA W/US: CPT | Performed by: PHYSICIAN ASSISTANT

## 2023-06-07 RX ORDER — HYALURONATE SODIUM 10 MG/ML
20 SYRINGE (ML) INTRAARTICULAR ONCE
Status: COMPLETED | OUTPATIENT
Start: 2023-06-07 | End: 2023-06-07

## 2023-06-07 RX ADMIN — Medication 20 MG: at 13:04

## 2023-06-07 NOTE — PROGRESS NOTES
Name: Young Degroot. YOB: 1955  Gender: male  MRN: [id number]     CC: BILATERAL Knee Osteoarthritis      PROCEDURE: #1 of 3 Hyaluronic Injection    After discussion of risks and benefits including but not limited to pain, infection, skin discoloration, and injury to blood vessels or nerves the patient verbally consented to proceed with injection of the BILATERAL. The patient is to restrict their activity for 48 hours. Radiology Report: US guidance was used to examine the joint, ensure adequate needle placement and to decrease the risk of joint aggravation. The intracondylar notch, retropatellar fat pad, patella tendon, patella and tibia were all visualized. Pre and post injection US still images were obtained and placed in the record. Image were obtained with a MEDSEEK ultrasound transducer (model [id number]). Procedure Note: After steriley prepping the BILATERAL knee, it was injected with a 2mL of Euflexxa and the medication was observed going into the intra-articular space via US guidance. The patient tolerated the procedure without difficulty.     LIZZ Canales

## 2023-06-21 ENCOUNTER — OFFICE VISIT (OUTPATIENT)
Dept: ORTHOPEDIC SURGERY | Age: 68
End: 2023-06-21
Payer: MEDICARE

## 2023-06-21 DIAGNOSIS — M17.11 PRIMARY OSTEOARTHRITIS OF RIGHT KNEE: Primary | ICD-10-CM

## 2023-06-21 DIAGNOSIS — M17.12 PRIMARY OSTEOARTHRITIS OF LEFT KNEE: ICD-10-CM

## 2023-06-21 PROCEDURE — 20611 DRAIN/INJ JOINT/BURSA W/US: CPT | Performed by: PHYSICIAN ASSISTANT

## 2023-06-21 RX ORDER — HYALURONATE SODIUM 10 MG/ML
20 SYRINGE (ML) INTRAARTICULAR ONCE
Status: COMPLETED | OUTPATIENT
Start: 2023-06-21 | End: 2023-06-21

## 2023-06-21 RX ADMIN — Medication 20 MG: at 16:40

## 2023-06-21 RX ADMIN — Medication 20 MG: at 16:41

## 2023-06-21 NOTE — PROGRESS NOTES
Name: Jie Sarmiento. YOB: 1955  Gender: male  MRN: [id number]     CC: BILATERAL Knee Osteoarthritis      PROCEDURE: #3 of 3 Hyaluronic Injection    After discussion of risks and benefits including but not limited to pain, infection, skin discoloration, and injury to blood vessels or nerves the patient verbally consented to proceed with injection of the BILATERAL. The patient is to restrict their activity for 48 hours. Radiology Report: US guidance was used to examine the joint, ensure adequate needle placement and to decrease the risk of joint aggravation. The intracondylar notch, retropatellar fat pad, patella tendon, patella and tibia were all visualized. Pre and post injection US still images were obtained and placed in the record. Image were obtained with a [employer] ultrasound transducer (model [id number]). Procedure Note: After steriley prepping the BILATERAL knee, it was injected with a 2mL of Euflexxa and the medication was observed going into the intra-articular space via US guidance. The patient tolerated the procedure without difficulty.     LIZZ Calderon

## (undated) DEVICE — GAUZE,SPONGE,4"X4",12PLY,WOVEN,NS,LF: Brand: MEDLINE

## (undated) DEVICE — NEEDLE SYR 18GA L1.5IN RED PLAS HUB S STL BLNT FILL W/O

## (undated) DEVICE — LUBE JELLY FOIL PACK 1.4 OZ: Brand: MEDLINE INDUSTRIES, INC.

## (undated) DEVICE — SINGLE PORT MANIFOLD: Brand: NEPTUNE 2

## (undated) DEVICE — YANKAUER,BULB TIP,W/O VENT,RIGID,STERILE: Brand: MEDLINE

## (undated) DEVICE — CANNULA NSL ORAL AD FOR CAPNOFLEX CO2 O2 AIRLFE

## (undated) DEVICE — TRAP SPEC RETRV CLR PLAS POLYP IN LN SUCT QUIK CTCH

## (undated) DEVICE — SNARE POLYP SM W13MMXL240CM SHTH DIA2.4MM OVL FLX DISP

## (undated) DEVICE — FORCEPS BX L240CM JAW DIA2.8MM L CAP W/ NDL MIC MESH TOOTH

## (undated) DEVICE — CONNECTOR TBNG OD5-7MM O2 END DISP

## (undated) DEVICE — CONTAINER FORMALIN PREFILLED 10% NBF 60ML

## (undated) DEVICE — BLOCK BITE AD 60FR W/ VELC STRP ADDRESSES MOST PT AND

## (undated) DEVICE — SYRINGE, LUER SLIP, STERILE, 60ML: Brand: MEDLINE

## (undated) DEVICE — SYRINGE MED 10ML LUERLOCK TIP W/O SFTY DISP

## (undated) DEVICE — AIRLIFE™ OXYGEN TUBING 7 FEET (2.1 M) CRUSH RESISTANT OXYGEN TUBING, VINYL TIPPED: Brand: AIRLIFE™

## (undated) DEVICE — SYRINGE MED 3ML CLR PLAS STD N CTRL LUERLOCK TIP DISP

## (undated) DEVICE — KENDALL RADIOLUCENT FOAM MONITORING ELECTRODE RECTANGULAR SHAPE: Brand: KENDALL